# Patient Record
Sex: MALE | Race: WHITE
[De-identification: names, ages, dates, MRNs, and addresses within clinical notes are randomized per-mention and may not be internally consistent; named-entity substitution may affect disease eponyms.]

---

## 2020-04-17 ENCOUNTER — HOSPITAL ENCOUNTER (INPATIENT)
Dept: HOSPITAL 75 - ER | Age: 85
LOS: 7 days | Discharge: HOSPICE | DRG: 871 | End: 2020-04-24
Attending: INTERNAL MEDICINE | Admitting: INTERNAL MEDICINE
Payer: MEDICARE

## 2020-04-17 VITALS — DIASTOLIC BLOOD PRESSURE: 54 MMHG | SYSTOLIC BLOOD PRESSURE: 121 MMHG

## 2020-04-17 VITALS — DIASTOLIC BLOOD PRESSURE: 56 MMHG | SYSTOLIC BLOOD PRESSURE: 117 MMHG

## 2020-04-17 VITALS — SYSTOLIC BLOOD PRESSURE: 123 MMHG | DIASTOLIC BLOOD PRESSURE: 69 MMHG

## 2020-04-17 VITALS — SYSTOLIC BLOOD PRESSURE: 124 MMHG | DIASTOLIC BLOOD PRESSURE: 56 MMHG

## 2020-04-17 VITALS — DIASTOLIC BLOOD PRESSURE: 44 MMHG | SYSTOLIC BLOOD PRESSURE: 81 MMHG

## 2020-04-17 VITALS — DIASTOLIC BLOOD PRESSURE: 56 MMHG | SYSTOLIC BLOOD PRESSURE: 119 MMHG

## 2020-04-17 VITALS — DIASTOLIC BLOOD PRESSURE: 68 MMHG | SYSTOLIC BLOOD PRESSURE: 134 MMHG

## 2020-04-17 VITALS — DIASTOLIC BLOOD PRESSURE: 50 MMHG | SYSTOLIC BLOOD PRESSURE: 108 MMHG

## 2020-04-17 VITALS — DIASTOLIC BLOOD PRESSURE: 55 MMHG | SYSTOLIC BLOOD PRESSURE: 109 MMHG

## 2020-04-17 VITALS — DIASTOLIC BLOOD PRESSURE: 67 MMHG | SYSTOLIC BLOOD PRESSURE: 92 MMHG

## 2020-04-17 VITALS — SYSTOLIC BLOOD PRESSURE: 118 MMHG | DIASTOLIC BLOOD PRESSURE: 54 MMHG

## 2020-04-17 VITALS — DIASTOLIC BLOOD PRESSURE: 61 MMHG | SYSTOLIC BLOOD PRESSURE: 85 MMHG

## 2020-04-17 VITALS — SYSTOLIC BLOOD PRESSURE: 134 MMHG | DIASTOLIC BLOOD PRESSURE: 58 MMHG

## 2020-04-17 VITALS — DIASTOLIC BLOOD PRESSURE: 59 MMHG | SYSTOLIC BLOOD PRESSURE: 129 MMHG

## 2020-04-17 VITALS — DIASTOLIC BLOOD PRESSURE: 61 MMHG | SYSTOLIC BLOOD PRESSURE: 129 MMHG

## 2020-04-17 VITALS — DIASTOLIC BLOOD PRESSURE: 76 MMHG | SYSTOLIC BLOOD PRESSURE: 84 MMHG

## 2020-04-17 VITALS — SYSTOLIC BLOOD PRESSURE: 127 MMHG | DIASTOLIC BLOOD PRESSURE: 60 MMHG

## 2020-04-17 VITALS — DIASTOLIC BLOOD PRESSURE: 51 MMHG | SYSTOLIC BLOOD PRESSURE: 122 MMHG

## 2020-04-17 VITALS — DIASTOLIC BLOOD PRESSURE: 53 MMHG | SYSTOLIC BLOOD PRESSURE: 90 MMHG

## 2020-04-17 VITALS — DIASTOLIC BLOOD PRESSURE: 54 MMHG | SYSTOLIC BLOOD PRESSURE: 108 MMHG

## 2020-04-17 VITALS — SYSTOLIC BLOOD PRESSURE: 102 MMHG | DIASTOLIC BLOOD PRESSURE: 34 MMHG

## 2020-04-17 VITALS — DIASTOLIC BLOOD PRESSURE: 78 MMHG | SYSTOLIC BLOOD PRESSURE: 109 MMHG

## 2020-04-17 VITALS — SYSTOLIC BLOOD PRESSURE: 121 MMHG | DIASTOLIC BLOOD PRESSURE: 57 MMHG

## 2020-04-17 VITALS — BODY MASS INDEX: 26.16 KG/M2 | HEIGHT: 68.9 IN | WEIGHT: 176.59 LBS

## 2020-04-17 VITALS — DIASTOLIC BLOOD PRESSURE: 57 MMHG | SYSTOLIC BLOOD PRESSURE: 119 MMHG

## 2020-04-17 VITALS — SYSTOLIC BLOOD PRESSURE: 126 MMHG | DIASTOLIC BLOOD PRESSURE: 54 MMHG

## 2020-04-17 VITALS — DIASTOLIC BLOOD PRESSURE: 60 MMHG | SYSTOLIC BLOOD PRESSURE: 112 MMHG

## 2020-04-17 VITALS — SYSTOLIC BLOOD PRESSURE: 130 MMHG | DIASTOLIC BLOOD PRESSURE: 58 MMHG

## 2020-04-17 VITALS — SYSTOLIC BLOOD PRESSURE: 122 MMHG | DIASTOLIC BLOOD PRESSURE: 52 MMHG

## 2020-04-17 VITALS — SYSTOLIC BLOOD PRESSURE: 99 MMHG | DIASTOLIC BLOOD PRESSURE: 62 MMHG

## 2020-04-17 VITALS — SYSTOLIC BLOOD PRESSURE: 108 MMHG | DIASTOLIC BLOOD PRESSURE: 54 MMHG

## 2020-04-17 VITALS — DIASTOLIC BLOOD PRESSURE: 47 MMHG | SYSTOLIC BLOOD PRESSURE: 106 MMHG

## 2020-04-17 VITALS — DIASTOLIC BLOOD PRESSURE: 72 MMHG | SYSTOLIC BLOOD PRESSURE: 83 MMHG

## 2020-04-17 VITALS — DIASTOLIC BLOOD PRESSURE: 62 MMHG | SYSTOLIC BLOOD PRESSURE: 130 MMHG

## 2020-04-17 VITALS — DIASTOLIC BLOOD PRESSURE: 54 MMHG | SYSTOLIC BLOOD PRESSURE: 103 MMHG

## 2020-04-17 VITALS — SYSTOLIC BLOOD PRESSURE: 125 MMHG | DIASTOLIC BLOOD PRESSURE: 65 MMHG

## 2020-04-17 VITALS — SYSTOLIC BLOOD PRESSURE: 108 MMHG | DIASTOLIC BLOOD PRESSURE: 50 MMHG

## 2020-04-17 VITALS — DIASTOLIC BLOOD PRESSURE: 59 MMHG | SYSTOLIC BLOOD PRESSURE: 126 MMHG

## 2020-04-17 DIAGNOSIS — M48.00: ICD-10-CM

## 2020-04-17 DIAGNOSIS — M19.91: ICD-10-CM

## 2020-04-17 DIAGNOSIS — D64.9: ICD-10-CM

## 2020-04-17 DIAGNOSIS — Z99.81: ICD-10-CM

## 2020-04-17 DIAGNOSIS — Z85.828: ICD-10-CM

## 2020-04-17 DIAGNOSIS — R65.21: ICD-10-CM

## 2020-04-17 DIAGNOSIS — R54: ICD-10-CM

## 2020-04-17 DIAGNOSIS — E86.0: ICD-10-CM

## 2020-04-17 DIAGNOSIS — A41.3: Primary | ICD-10-CM

## 2020-04-17 DIAGNOSIS — E87.5: ICD-10-CM

## 2020-04-17 DIAGNOSIS — K29.71: ICD-10-CM

## 2020-04-17 DIAGNOSIS — J96.22: ICD-10-CM

## 2020-04-17 DIAGNOSIS — N17.9: ICD-10-CM

## 2020-04-17 DIAGNOSIS — K21.9: ICD-10-CM

## 2020-04-17 DIAGNOSIS — J44.0: ICD-10-CM

## 2020-04-17 DIAGNOSIS — J96.21: ICD-10-CM
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
## 2020-04-17 DIAGNOSIS — J14: ICD-10-CM

## 2020-04-17 DIAGNOSIS — I10: ICD-10-CM

## 2020-04-17 DIAGNOSIS — S12.110A: ICD-10-CM

## 2020-04-17 DIAGNOSIS — E87.0: ICD-10-CM

## 2020-04-17 DIAGNOSIS — J44.1: ICD-10-CM

## 2020-04-17 LAB
ALBUMIN SERPL-MCNC: 2.3 GM/DL (ref 3.2–4.5)
ALBUMIN SERPL-MCNC: 2.8 GM/DL (ref 3.2–4.5)
ALP SERPL-CCNC: 56 U/L (ref 40–136)
ALP SERPL-CCNC: 69 U/L (ref 40–136)
ALT SERPL-CCNC: 22 U/L (ref 0–55)
ALT SERPL-CCNC: 26 U/L (ref 0–55)
APTT BLD: 35 SEC (ref 24–35)
APTT PPP: YELLOW S
ARTERIAL PATENCY WRIST A: (no result)
BACTERIA #/AREA URNS HPF: NEGATIVE /HPF
BASE EXCESS STD BLDA CALC-SCNC: 1.4 MMOL/L (ref -2.5–2.5)
BASE EXCESS STD BLDA CALC-SCNC: 4.3 MMOL/L (ref -2.5–2.5)
BASE EXCESS STD BLDA CALC-SCNC: 7.7 MMOL/L (ref -2.5–2.5)
BASOPHILS # BLD AUTO: 0 10^3/UL (ref 0–0.1)
BASOPHILS # BLD AUTO: 0 10^3/UL (ref 0–0.1)
BASOPHILS NFR BLD AUTO: 0 % (ref 0–10)
BASOPHILS NFR BLD AUTO: 0 % (ref 0–10)
BASOPHILS NFR BLD MANUAL: 0 %
BDY SITE: (no result)
BILIRUB SERPL-MCNC: 0.4 MG/DL (ref 0.1–1)
BILIRUB SERPL-MCNC: 0.4 MG/DL (ref 0.1–1)
BILIRUB UR QL STRIP: NEGATIVE
BODY TEMPERATURE: 38
BODY TEMPERATURE: 38
BODY TEMPERATURE: 38.6
BUN/CREAT SERPL: 33
BUN/CREAT SERPL: 37
CALCIUM SERPL-MCNC: 7.9 MG/DL (ref 8.5–10.1)
CALCIUM SERPL-MCNC: 9 MG/DL (ref 8.5–10.1)
CHLORIDE SERPL-SCNC: 103 MMOL/L (ref 98–107)
CHLORIDE SERPL-SCNC: 96 MMOL/L (ref 98–107)
CO2 BLDA CALC-SCNC: 28.2 MMOL/L (ref 21–31)
CO2 BLDA CALC-SCNC: 32.8 MMOL/L (ref 21–31)
CO2 BLDA CALC-SCNC: 35.8 MMOL/L (ref 21–31)
CO2 SERPL-SCNC: 25 MMOL/L (ref 21–32)
CO2 SERPL-SCNC: 30 MMOL/L (ref 21–32)
CREAT SERPL-MCNC: 1.19 MG/DL (ref 0.6–1.3)
CREAT SERPL-MCNC: 1.56 MG/DL (ref 0.6–1.3)
D DIMER PPP FEU-MCNC: 3.82 UG/ML (ref 0–0.49)
EOSINOPHIL # BLD AUTO: 0.1 10^3/UL (ref 0–0.3)
EOSINOPHIL # BLD AUTO: 0.3 10^3/UL (ref 0–0.3)
EOSINOPHIL NFR BLD AUTO: 1 % (ref 0–10)
EOSINOPHIL NFR BLD AUTO: 2 % (ref 0–10)
EOSINOPHIL NFR BLD MANUAL: 3 %
ERYTHROCYTE [DISTWIDTH] IN BLOOD BY AUTOMATED COUNT: 14.8 % (ref 10–14.5)
ERYTHROCYTE [DISTWIDTH] IN BLOOD BY AUTOMATED COUNT: 14.9 % (ref 10–14.5)
ERYTHROCYTE [SEDIMENTATION RATE] IN BLOOD: 35 MM/HR (ref 0–30)
FIBRINOGEN PPP-MCNC: CLEAR MG/DL
GFR SERPLBLD BASED ON 1.73 SQ M-ARVRAT: 43 ML/MIN
GFR SERPLBLD BASED ON 1.73 SQ M-ARVRAT: 58 ML/MIN
GLUCOSE SERPL-MCNC: 103 MG/DL (ref 70–105)
GLUCOSE SERPL-MCNC: 120 MG/DL (ref 70–105)
GLUCOSE UR STRIP-MCNC: NEGATIVE MG/DL
HCT VFR BLD CALC: 28 % (ref 40–54)
HCT VFR BLD CALC: 30 % (ref 40–54)
HGB BLD-MCNC: 8.5 G/DL (ref 13.3–17.7)
HGB BLD-MCNC: 9.1 G/DL (ref 13.3–17.7)
HYALINE CASTS #/AREA URNS LPF: (no result) /LPF
INHALED O2 FLOW RATE: (no result) L/MIN
INR PPP: 1.4 (ref 0.8–1.4)
KETONES UR QL STRIP: NEGATIVE
LEUKOCYTE ESTERASE UR QL STRIP: NEGATIVE
LYMPHOCYTES # BLD AUTO: 1.1 X 10^3 (ref 1–4)
LYMPHOCYTES # BLD AUTO: 1.5 X 10^3 (ref 1–4)
LYMPHOCYTES NFR BLD AUTO: 11 % (ref 12–44)
LYMPHOCYTES NFR BLD AUTO: 7 % (ref 12–44)
MAGNESIUM SERPL-MCNC: 2.1 MG/DL (ref 1.6–2.4)
MAGNESIUM SERPL-MCNC: 2.1 MG/DL (ref 1.6–2.4)
MANUAL DIFFERENTIAL PERFORMED BLD QL: NO
MANUAL DIFFERENTIAL PERFORMED BLD QL: YES
MCH RBC QN AUTO: 28 PG (ref 25–34)
MCH RBC QN AUTO: 28 PG (ref 25–34)
MCHC RBC AUTO-ENTMCNC: 31 G/DL (ref 32–36)
MCHC RBC AUTO-ENTMCNC: 31 G/DL (ref 32–36)
MCV RBC AUTO: 91 FL (ref 80–99)
MCV RBC AUTO: 92 FL (ref 80–99)
METAMYELOCYTES NFR BLD: 1 %
MONOCYTES # BLD AUTO: 1.7 X 10^3 (ref 0–1)
MONOCYTES # BLD AUTO: 1.8 X 10^3 (ref 0–1)
MONOCYTES NFR BLD AUTO: 12 % (ref 0–12)
MONOCYTES NFR BLD AUTO: 12 % (ref 0–12)
MONOCYTES NFR BLD: 5 %
NEUTROPHILS # BLD AUTO: 10.7 X 10^3 (ref 1.8–7.8)
NEUTROPHILS # BLD AUTO: 12.1 X 10^3 (ref 1.8–7.8)
NEUTROPHILS NFR BLD AUTO: 75 % (ref 42–75)
NEUTROPHILS NFR BLD AUTO: 80 % (ref 42–75)
NEUTS BAND NFR BLD MANUAL: 74 %
NEUTS BAND NFR BLD: 9 %
NITRITE UR QL STRIP: NEGATIVE
PCO2 BLDA: 54 MMHG (ref 35–45)
PCO2 BLDA: 72 MMHG (ref 35–45)
PCO2 BLDA: 73 MMHG (ref 35–45)
PH BLDA: 7.26 [PH] (ref 7.37–7.43)
PH BLDA: 7.3 [PH] (ref 7.37–7.43)
PH BLDA: 7.32 [PH] (ref 7.37–7.43)
PH UR STRIP: 5 [PH] (ref 5–9)
PHOSPHATE SERPL-MCNC: 3.5 MG/DL (ref 2.3–4.7)
PHOSPHATE SERPL-MCNC: 4.5 MG/DL (ref 2.3–4.7)
PLATELET # BLD: 399 10^3/UL (ref 130–400)
PLATELET # BLD: 419 10^3/UL (ref 130–400)
PMV BLD AUTO: 9.5 FL (ref 7.4–10.4)
PMV BLD AUTO: 9.8 FL (ref 7.4–10.4)
PO2 BLDA: 117 MMHG (ref 79–93)
PO2 BLDA: 39 MMHG (ref 79–93)
PO2 BLDA: 87 MMHG (ref 79–93)
POTASSIUM SERPL-SCNC: 4.4 MMOL/L (ref 3.6–5)
POTASSIUM SERPL-SCNC: 5.4 MMOL/L (ref 3.6–5)
PROT SERPL-MCNC: 5 GM/DL (ref 6.4–8.2)
PROT SERPL-MCNC: 5.8 GM/DL (ref 6.4–8.2)
PROT UR QL STRIP: NEGATIVE
PROTHROMBIN TIME: 17.3 SEC (ref 12.2–14.7)
RBC #/AREA URNS HPF: (no result) /HPF
RBC MORPH BLD: NORMAL
SAO2 % BLDA FROM PO2: 51 % (ref 94–100)
SAO2 % BLDA FROM PO2: 95 % (ref 94–100)
SAO2 % BLDA FROM PO2: 98 % (ref 94–100)
SODIUM SERPL-SCNC: 136 MMOL/L (ref 135–145)
SODIUM SERPL-SCNC: 137 MMOL/L (ref 135–145)
SP GR UR STRIP: 1.02 (ref 1.02–1.02)
SQUAMOUS #/AREA URNS HPF: (no result) /HPF
VARIANT LYMPHS NFR BLD MANUAL: 8 %
VENTILATION MODE VENT: NO
VENTILATION MODE VENT: YES
VENTILATION MODE VENT: YES
WBC # BLD AUTO: 14.3 10^3/UL (ref 4.3–11)
WBC # BLD AUTO: 15.2 10^3/UL (ref 4.3–11)
WBC #/AREA URNS HPF: (no result) /HPF

## 2020-04-17 PROCEDURE — 96366 THER/PROPH/DIAG IV INF ADDON: CPT

## 2020-04-17 PROCEDURE — 85610 PROTHROMBIN TIME: CPT

## 2020-04-17 PROCEDURE — 80069 RENAL FUNCTION PANEL: CPT

## 2020-04-17 PROCEDURE — 85014 HEMATOCRIT: CPT

## 2020-04-17 PROCEDURE — 82962 GLUCOSE BLOOD TEST: CPT

## 2020-04-17 PROCEDURE — 96365 THER/PROPH/DIAG IV INF INIT: CPT

## 2020-04-17 PROCEDURE — 96361 HYDRATE IV INFUSION ADD-ON: CPT

## 2020-04-17 PROCEDURE — 87088 URINE BACTERIA CULTURE: CPT

## 2020-04-17 PROCEDURE — 83880 ASSAY OF NATRIURETIC PEPTIDE: CPT

## 2020-04-17 PROCEDURE — 80048 BASIC METABOLIC PNL TOTAL CA: CPT

## 2020-04-17 PROCEDURE — 71045 X-RAY EXAM CHEST 1 VIEW: CPT

## 2020-04-17 PROCEDURE — 87804 INFLUENZA ASSAY W/OPTIC: CPT

## 2020-04-17 PROCEDURE — 36415 COLL VENOUS BLD VENIPUNCTURE: CPT

## 2020-04-17 PROCEDURE — 94760 N-INVAS EAR/PLS OXIMETRY 1: CPT

## 2020-04-17 PROCEDURE — 94003 VENT MGMT INPAT SUBQ DAY: CPT

## 2020-04-17 PROCEDURE — 85379 FIBRIN DEGRADATION QUANT: CPT

## 2020-04-17 PROCEDURE — 87081 CULTURE SCREEN ONLY: CPT

## 2020-04-17 PROCEDURE — 51702 INSERT TEMP BLADDER CATH: CPT

## 2020-04-17 PROCEDURE — 94799 UNLISTED PULMONARY SVC/PX: CPT

## 2020-04-17 PROCEDURE — 96375 TX/PRO/DX INJ NEW DRUG ADDON: CPT

## 2020-04-17 PROCEDURE — 87635 SARS-COV-2 COVID-19 AMP PRB: CPT

## 2020-04-17 PROCEDURE — 85025 COMPLETE CBC W/AUTO DIFF WBC: CPT

## 2020-04-17 PROCEDURE — 87186 SC STD MICRODIL/AGAR DIL: CPT

## 2020-04-17 PROCEDURE — 86141 C-REACTIVE PROTEIN HS: CPT

## 2020-04-17 PROCEDURE — 31500 INSERT EMERGENCY AIRWAY: CPT

## 2020-04-17 PROCEDURE — 87040 BLOOD CULTURE FOR BACTERIA: CPT

## 2020-04-17 PROCEDURE — 87185 SC STD ENZYME DETCJ PER NZM: CPT

## 2020-04-17 PROCEDURE — 94002 VENT MGMT INPAT INIT DAY: CPT

## 2020-04-17 PROCEDURE — 84100 ASSAY OF PHOSPHORUS: CPT

## 2020-04-17 PROCEDURE — 87205 SMEAR GRAM STAIN: CPT

## 2020-04-17 PROCEDURE — 83735 ASSAY OF MAGNESIUM: CPT

## 2020-04-17 PROCEDURE — 36600 WITHDRAWAL OF ARTERIAL BLOOD: CPT

## 2020-04-17 PROCEDURE — 82805 BLOOD GASES W/O2 SATURATION: CPT

## 2020-04-17 PROCEDURE — 80053 COMPREHEN METABOLIC PANEL: CPT

## 2020-04-17 PROCEDURE — 85730 THROMBOPLASTIN TIME PARTIAL: CPT

## 2020-04-17 PROCEDURE — 85027 COMPLETE CBC AUTOMATED: CPT

## 2020-04-17 PROCEDURE — 85007 BL SMEAR W/DIFF WBC COUNT: CPT

## 2020-04-17 PROCEDURE — 71275 CT ANGIOGRAPHY CHEST: CPT

## 2020-04-17 PROCEDURE — 83605 ASSAY OF LACTIC ACID: CPT

## 2020-04-17 PROCEDURE — 84145 PROCALCITONIN (PCT): CPT

## 2020-04-17 PROCEDURE — 81000 URINALYSIS NONAUTO W/SCOPE: CPT

## 2020-04-17 PROCEDURE — 5A1945Z RESPIRATORY VENTILATION, 24-96 CONSECUTIVE HOURS: ICD-10-PCS | Performed by: EMERGENCY MEDICINE

## 2020-04-17 PROCEDURE — 94640 AIRWAY INHALATION TREATMENT: CPT

## 2020-04-17 PROCEDURE — 94660 CPAP INITIATION&MGMT: CPT

## 2020-04-17 PROCEDURE — 87631 RESP VIRUS 3-5 TARGETS: CPT

## 2020-04-17 PROCEDURE — 85652 RBC SED RATE AUTOMATED: CPT

## 2020-04-17 PROCEDURE — 87070 CULTURE OTHR SPECIMN AEROBIC: CPT

## 2020-04-17 PROCEDURE — 85018 HEMOGLOBIN: CPT

## 2020-04-17 PROCEDURE — 0BH17EZ INSERTION OF ENDOTRACHEAL AIRWAY INTO TRACHEA, VIA NATURAL OR ARTIFICIAL OPENING: ICD-10-PCS | Performed by: EMERGENCY MEDICINE

## 2020-04-17 PROCEDURE — 83615 LACTATE (LD) (LDH) ENZYME: CPT

## 2020-04-17 RX ADMIN — POTASSIUM CHLORIDE SCH MLS/HR: 200 INJECTION, SOLUTION INTRAVENOUS at 05:37

## 2020-04-17 RX ADMIN — Medication SCH MLS/HR: at 11:56

## 2020-04-17 RX ADMIN — PANTOPRAZOLE SODIUM SCH MG: 40 INJECTION, POWDER, FOR SOLUTION INTRAVENOUS at 20:25

## 2020-04-17 RX ADMIN — SODIUM CHLORIDE SCH MLS/HR: 900 INJECTION, SOLUTION INTRAVENOUS at 08:21

## 2020-04-17 RX ADMIN — SODIUM CHLORIDE SCH MLS/HR: 900 INJECTION, SOLUTION INTRAVENOUS at 04:30

## 2020-04-17 RX ADMIN — VASOPRESSIN SCH MLS/HR: 20 INJECTION INTRAVENOUS at 21:56

## 2020-04-17 RX ADMIN — VASOPRESSIN SCH MLS/HR: 20 INJECTION INTRAVENOUS at 11:56

## 2020-04-17 RX ADMIN — SODIUM CHLORIDE SCH MLS/HR: 900 INJECTION, SOLUTION INTRAVENOUS at 10:30

## 2020-04-17 RX ADMIN — MAGNESIUM SULFATE IN DEXTROSE SCH MLS/HR: 10 INJECTION, SOLUTION INTRAVENOUS at 05:37

## 2020-04-17 RX ADMIN — MICONAZOLE NITRATE SCH APPLIC: 20 POWDER TOPICAL at 20:26

## 2020-04-17 RX ADMIN — INSULIN ASPART SCH UNIT: 100 INJECTION, SOLUTION INTRAVENOUS; SUBCUTANEOUS at 23:31

## 2020-04-17 RX ADMIN — MICONAZOLE NITRATE SCH APPLIC: 20 POWDER TOPICAL at 11:13

## 2020-04-17 RX ADMIN — POTASSIUM CHLORIDE SCH MEQ: 1500 TABLET, EXTENDED RELEASE ORAL at 05:37

## 2020-04-17 RX ADMIN — Medication SCH MLS/HR: at 19:53

## 2020-04-17 RX ADMIN — SODIUM CHLORIDE SCH MLS/HR: 900 INJECTION, SOLUTION INTRAVENOUS at 01:10

## 2020-04-17 RX ADMIN — INSULIN ASPART SCH UNIT: 100 INJECTION, SOLUTION INTRAVENOUS; SUBCUTANEOUS at 17:59

## 2020-04-17 RX ADMIN — SODIUM CHLORIDE SCH MLS/HR: 900 INJECTION, SOLUTION INTRAVENOUS at 17:39

## 2020-04-17 RX ADMIN — VASOPRESSIN SCH MLS/HR: 20 INJECTION INTRAVENOUS at 05:37

## 2020-04-17 RX ADMIN — Medication SCH MLS/HR: at 06:17

## 2020-04-17 RX ADMIN — SODIUM CHLORIDE SCH MLS/HR: 900 INJECTION, SOLUTION INTRAVENOUS at 13:50

## 2020-04-17 RX ADMIN — SODIUM CHLORIDE SCH MLS/HR: 900 INJECTION, SOLUTION INTRAVENOUS at 22:00

## 2020-04-17 NOTE — XMS REPORT
Continuity of Care Document

                             Created on: 2020



DEAL, ONES

External Reference #: [id number]

: 1934

Sex: Male



Demographics





                          Address                   [address]

[place]  [address]

 

                          Home Phone                [phone] x

 

                          Preferred Language        Unknown

 

                          Marital Status            Unknown

 

                          Mandaen Affiliation     Unknown

 

                          Race                      Unknown

 

                          Ethnic Group              Unknown





Author





                          Organization              Unknown

 

                          Address                   Unknown

 

                          Phone                     Unavailable



              



Allergies

      



             Active           Description           Code           Type         

  Severity   

                Reaction           Onset           Reported/Identified          

 

Relationship to Patient                 Clinical Status        

 

                Yes             No Known Drug Allergies           [id number]    

       Drug 

Allergy           Unknown           N/A                             2020  

      

                                                             



                  



Medications

      



There is no data.                  



Problems

      



             Date Dx Coded           Attending           Type           Code    

       

Diagnosis                               Diagnosed By        

 

                2020           CRISTOPHER SUAREZ DO           Ot       

       J44.9       

                          CHRONIC OBSTRUCTIVE PULMONARY DISEASE, U              

      

 

                2020           CRISTOPHER SUAREZ DO           Ot       

       M16.12      

                          UNILATERAL PRIMARY OSTEOARTHRITIS, LEFT               

      

 

                2020           CRISTOPHER SUAREZ DO           Ot       

       M17.12      

                          UNILATERAL PRIMARY OSTEOARTHRITIS, LEFT               

      

 

                2020           CRISTOPHER SUAREZ DO           Ot       

       W19.XXXA    

                          UNSPECIFIED FALL, INITIAL ENCOUNTER                   

 

 

                2020           CRISTOPHER SUAREZ DO           Ot       

       J44.9       

                          CHRONIC OBSTRUCTIVE PULMONARY DISEASE, U              

      

 

             2020                        Ot           R40.2142           C

MARCELA SCALE, 

EYES OPEN, SPONTANEOUS, EMR                      

 

             2020                        Ot           R40.2252           C

MARCELA SCALE, 

BEST VERBAL RESPONSE, ORIENT                     

 

             2020                        Ot           R40.2362           C

MARCELA SCALE, 

BEST MOTOR RESPONSE, OBEYS C                     

 

             2020                        Ot           S00.81XA           A

BRASION OF 

OTHER PART OF HEAD, INITIAL                      

 

             2020                        Ot           S12.112A           N

ONDISPLACED 

TYPE II DENS FRACTURE, INIT                      

 

             2020                        Ot           S61.412A           L

ACERATION 

WITHOUT FOREIGN BODY OF LEFT                     

 

             2020                        Ot           W19.XXXA           U

NSPECIFIED 

FALL, INITIAL ENCOUNTER                          

 

             2020                        Ot           R40.2142           C

MARCELA SCALE, 

EYES OPEN, SPONTANEOUS, EMR                      

 

             2020                        Ot           R40.2252           C

MARCELA SCALE, 

BEST VERBAL RESPONSE, ORIENT                     

 

             2020                        Ot           R40.2362           C

MARCELA SCALE, 

BEST MOTOR RESPONSE, OBEYS C                     

 

             2020                        Ot           S00.81XA           A

BRASION OF 

OTHER PART OF HEAD, INITIAL                      

 

             2020                        Ot           S12.112A           N

ONDISPLACED 

TYPE II DENS FRACTURE, INIT                      

 

             2020                        Ot           S61.412A           L

ACERATION 

WITHOUT FOREIGN BODY OF LEFT                     

 

             2020                        Ot           W19.XXXA           U

NSPECIFIED 

FALL, INITIAL ENCOUNTER                          

 

                2020           GELLENDER DO, CRISTOPHER FLORES           Ot       

       J44.9       

                          CHRONIC OBSTRUCTIVE PULMONARY DISEASE, U              

      

 

                2020           GELLENDER DO, CRISTOPHER FLORES           Ot       

       M16.12      

                          UNILATERAL PRIMARY OSTEOARTHRITIS, LEFT               

      

 

                2020           GELLENDER DO, CRISTOPHER FLORES           Ot       

       M17.12      

                          UNILATERAL PRIMARY OSTEOARTHRITIS, LEFT               

      

 

                2020           GELLENDER DO, CRISTOPHER FLORES           Ot       

       W19.XXXA    

                          UNSPECIFIED FALL, INITIAL ENCOUNTER                   

 

 

                2020           GELLENDER DO, CRISTOPHER FLORES           Ot       

       J44.9       

                          CHRONIC OBSTRUCTIVE PULMONARY DISEASE, U              

      

 

                2020           GELLENDER DO, CRISTOPHER FLORES           Ot       

       J44.9       

                          CHRONIC OBSTRUCTIVE PULMONARY DISEASE, U              

      

 

                2020           GELLENDER DO, CRISTOPHER FLORES           Ot       

       J44.9       

                          CHRONIC OBSTRUCTIVE PULMONARY DISEASE, U              

      

 

                2020           GELLENDER DO, CRISTOPHER FLORES           Ot       

       M16.12      

                          UNILATERAL PRIMARY OSTEOARTHRITIS, LEFT               

      

 

                2020           GELLENDER DO, CRISTOPHER FLORES           Ot       

       M17.12      

                          UNILATERAL PRIMARY OSTEOARTHRITIS, LEFT               

      

 

                2020           GELLENDER DO, CRISTOPHER FLORES           Ot       

       W19.XXXA    

                          UNSPECIFIED FALL, INITIAL ENCOUNTER                   

 

 

                2020           GELLENDER DO, CRISTOPHER FLORES           Ot       

       J44.9       

                          CHRONIC OBSTRUCTIVE PULMONARY DISEASE, U              

      



                                                                            



Procedures

      



There is no data.                  



Results

      



There is no data.              



Encounters

      



                ACCT No.           Visit Date/Time           Discharge          

 Status         

             Pt. Type           Provider           Facility           Loc./Unit 

          

Complaint        

 

                    [id number]           2020 16:14:00           

020 23:59:59        

                CLS             Outpatient           GELLENDER DOCRISTOPHER    

       Via 

[facility]           RAD                       COPD        

 

                    [id number]           2020 12:10:00           

020 23:59:59        

                CLS             Outpatient           GELLENDER DO CRISTOPHER FLORES    

       Via 

[facility]           RAD                       FELL HIT LEFT H

IP AND KNEE 

      

 

             [id number]           2020 10:00:00                        P

MERNA CASAREZ MD, CLAUDY CORRIGAN           [facility]ia [facility]

                          CARD                      CKD STAGE III,HTN,COPD      

  

 

             [id number]           2020 15:27:00                         

            

Document Registration

## 2020-04-17 NOTE — PULMONARY CONSULTATION
History of Present Illness


History of Present Illness


Date Seen by Provider:  Apr 17, 2020


Time Seen by Provider:  06:33


Date of Admission





History of Present Illness


84yo presented from ECF secondary to worsening SOB





Allergies and Home Medications


Allergies


Coded Allergies:  


     No Known Drug Allergies (Unverified , 3/13/20)





Past Medical-Social-Family Hx


Patient Social History


Alcohol Use:  Denies Use


Recreational Drug Use:  No


Smoking Status:  Never a Smoker


Recent Foreign Travel:  No


Contact w/Someone Who Travel:  No


Recent Infectious Disease Expo:  No


Recent Hopitalizations:  No


Physical Abuse:  No


Sexual Abuse:  No


Mistreated:  No


Fear:  No





Immunizations Up To Date


Tetanus Booster (TDap):  More than 5yrs





Seasonal Allergies


Seasonal Allergies:  No





Past Medical History


Surgeries:  Yes (skin cancer removed from lip)


Respiratory:  Yes


COPD


Cardiac:  Yes


Hypertension


Neurological:  No


Genitourinary:  Yes


Renal Failure


Gastrointestinal:  Yes


Abdominal Hernia


Musculoskeletal:  Yes (osteroarthitis, spinal stenosis)


Chronic Back Pain


Endocrine:  No


HEENT:  Yes


Hearing Impairment:  Hard of Hearing


Cancer:  Yes


Skin


Did You Recieve Any Treatments:  Yes


What Type of Treatment Did You:  Surgical Intervention


Psychosocial:  No


Integumentary:  No





Sepsis Event


Evaluation


Height, Weight, BMI


Height: '"


Weight: lbs. oz. kg; 27.00 BMI


Method:





Exam


Exam





Vital Signs








  Date Time  Temp Pulse Resp B/P (MAP) Pulse Ox O2 Delivery O2 Flow Rate FiO2


 


4/17/20 06:17  86  108/54    


 


4/17/20 05:45  83 24 81/44 (56) 100 Mechanical Ventilator 70.00 


 


4/17/20 05:15  88 20 83/72 (76) 98 Mechanical Ventilator 70.00 


 


4/17/20 04:45  90 18 102/34 (56) 96 Mechanical Ventilator 70.00 


 


4/17/20 04:43  92   96  100.00 


 


4/17/20 04:30  95 19 84/76 (79) 100 Mechanical Ventilator 70.00 


 


4/17/20 04:30  93  136/67    


 


4/17/20 04:15  98 24 99/62 (74) 98 Mechanical Ventilator 70.00 


 


4/17/20 04:00  99 20 118/54 (75) 100 Mechanical Ventilator 70.00 


 


4/17/20 03:42  100      


 


4/17/20 03:13 37.9 108 24 134/68 99 Mechanical Ventilator  


 


4/17/20 03:00 37.6 108 24 134/68 (90) 99 Mechanical Ventilator  


 


4/17/20 02:45  105 24 123/69 (87) 100 Mechanical Ventilator 50.00 


 


4/17/20 02:30  100 24 119/57 (77) 99 Mechanical Ventilator 50.00 


 


4/17/20 02:20  107  119/57    


 


4/17/20 02:15  100 24 103/54 (70) 97   


 


4/17/20 02:15  105 44  96   60


 


4/17/20 02:05  98 22 92/67 (75) 98   


 


4/17/20 01:45 38.0 101 24 85/61 (69) 96   


 


4/17/20 01:30  111 22 109/78 (88) 95   


 


4/17/20 01:15  96 24 90/53 (65) 96 Nasal Cannula 3.00 


 


4/17/20 01:00  96 24 108/54 (72) 96 Nasal Cannula 3.00 


 


4/17/20 00:45 38.4 96 26 111/67 (82) 95 Nasal Cannula 3.00 


 


4/17/20 00:45     95 Nasal Cannula 3.00 














I & O 


 


 4/17/20





 07:00


 


Intake Total 3010 ml


 


Output Total 325 ml


 


Balance 2685 ml








Height & Weight


Height: '"


Weight: lbs. oz. kg; 27.00 BMI


Method:


Capillary Refill:  Less Than 3 Seconds


Gastrointestinal:  normal bowel sounds, non tender, soft, other (Ventral hernia)





Results


Lab


Laboratory Tests


4/17/20 00:50











Assessment/Plan


Assessment/Plan


Acute respiratory failure with pneumonia with Severe sepsis - question of 

aspiration 


   -Change Abx to Vanco, Zosyn and azithromycin d/c cefepime 


   -Pan cultures pending 


   -influenza is negative 


   -Check RVP 


   -COVID is pending 


Acute renal failure 


   -IVF 


   -monitor 


Hypotension


   -Currently on Levophed 


   -Give another liter bolus of LR 


Dehydration 


   -IVF 


Anemia 


   -Monitor











LENORA SLADE DO              Apr 17, 2020 06:38

## 2020-04-17 NOTE — XMS REPORT
Continuity of Care Document

                             Created on: 2020



DEAL, ONES

External Reference #: [id number]

: 1934

Sex: Male



Demographics





                          Address                   [address]

[place]  [address]

 

                          Home Phone                [phone] x

 

                          Preferred Language        Unknown

 

                          Marital Status            Unknown

 

                          Zoroastrianism Affiliation     Unknown

 

                          Race                      Unknown

 

                          Ethnic Group              Unknown





Author





                          Organization              Unknown

 

                          Address                   Unknown

 

                          Phone                     Unavailable



              



Allergies

      



             Active           Description           Code           Type         

  Severity   

                Reaction           Onset           Reported/Identified          

 

Relationship to Patient                 Clinical Status        

 

                Yes             No Known Drug Allergies           [id number]    

       Drug 

Allergy           Unknown           N/A                             2020  

      

                                                             



                  



Medications

      



There is no data.                  



Problems

      



             Date Dx Coded           Attending           Type           Code    

       

Diagnosis                               Diagnosed By        

 

                2020           CRISTOPHER SUAREZ DO           Ot       

       J44.9       

                          CHRONIC OBSTRUCTIVE PULMONARY DISEASE, U              

      

 

                2020           CRISTOPHER SUAREZ DO           Ot       

       M16.12      

                          UNILATERAL PRIMARY OSTEOARTHRITIS, LEFT               

      

 

                2020           CRISTOPHER SUAREZ DO           Ot       

       M17.12      

                          UNILATERAL PRIMARY OSTEOARTHRITIS, LEFT               

      

 

                2020           CRISTOPHER SUAREZ DO           Ot       

       W19.XXXA    

                          UNSPECIFIED FALL, INITIAL ENCOUNTER                   

 

 

                2020           CRISTOPHER SUAREZ DO           Ot       

       J44.9       

                          CHRONIC OBSTRUCTIVE PULMONARY DISEASE, U              

      

 

             2020                        Ot           R40.2142           C

MARCELA SCALE, 

EYES OPEN, SPONTANEOUS, EMR                      

 

             2020                        Ot           R40.2252           C

MARCELA SCALE, 

BEST VERBAL RESPONSE, ORIENT                     

 

             2020                        Ot           R40.2362           C

MARCELA SCALE, 

BEST MOTOR RESPONSE, OBEYS C                     

 

             2020                        Ot           S00.81XA           A

BRASION OF 

OTHER PART OF HEAD, INITIAL                      

 

             2020                        Ot           S12.112A           N

ONDISPLACED 

TYPE II DENS FRACTURE, INIT                      

 

             2020                        Ot           S61.412A           L

ACERATION 

WITHOUT FOREIGN BODY OF LEFT                     

 

             2020                        Ot           W19.XXXA           U

NSPECIFIED 

FALL, INITIAL ENCOUNTER                          

 

             2020                        Ot           R40.2142           C

MARCELA SCALE, 

EYES OPEN, SPONTANEOUS, EMR                      

 

             2020                        Ot           R40.2252           C

MARCELA SCALE, 

BEST VERBAL RESPONSE, ORIENT                     

 

             2020                        Ot           R40.2362           C

MARCELA SCALE, 

BEST MOTOR RESPONSE, OBEYS C                     

 

             2020                        Ot           S00.81XA           A

BRASION OF 

OTHER PART OF HEAD, INITIAL                      

 

             2020                        Ot           S12.112A           N

ONDISPLACED 

TYPE II DENS FRACTURE, INIT                      

 

             2020                        Ot           S61.412A           L

ACERATION 

WITHOUT FOREIGN BODY OF LEFT                     

 

             2020                        Ot           W19.XXXA           U

NSPECIFIED 

FALL, INITIAL ENCOUNTER                          

 

                2020           GELLENDER DO, CRISTOPHER FLORES           Ot       

       J44.9       

                          CHRONIC OBSTRUCTIVE PULMONARY DISEASE, U              

      

 

                2020           GELLENDER DO, CRISTOPHER FLORES           Ot       

       M16.12      

                          UNILATERAL PRIMARY OSTEOARTHRITIS, LEFT               

      

 

                2020           GELLENDER DO, CRISTOPHER FLORES           Ot       

       M17.12      

                          UNILATERAL PRIMARY OSTEOARTHRITIS, LEFT               

      

 

                2020           GELLENDER DO, CRISTOPHER FLORES           Ot       

       W19.XXXA    

                          UNSPECIFIED FALL, INITIAL ENCOUNTER                   

 

 

                2020           GELLENDER DO, CRISTOPHER FLORES           Ot       

       J44.9       

                          CHRONIC OBSTRUCTIVE PULMONARY DISEASE, U              

      

 

                2020           GELLENDER DO, CRISTOPHER FLORES           Ot       

       J44.9       

                          CHRONIC OBSTRUCTIVE PULMONARY DISEASE, U              

      

 

                2020           GELLENDER DO, CRISTOPHER FLORES           Ot       

       J44.9       

                          CHRONIC OBSTRUCTIVE PULMONARY DISEASE, U              

      

 

                2020           GELLENDER DO, CRISTOPHER FLORES           Ot       

       M16.12      

                          UNILATERAL PRIMARY OSTEOARTHRITIS, LEFT               

      

 

                2020           GELLENDER DO, CRISTOPHER FLORES           Ot       

       M17.12      

                          UNILATERAL PRIMARY OSTEOARTHRITIS, LEFT               

      

 

                2020           GELLENDER DO, CRISTOPHER FLORES           Ot       

       W19.XXXA    

                          UNSPECIFIED FALL, INITIAL ENCOUNTER                   

 

 

                2020           GELLENDER DO, CRISTOPHER FLORES           Ot       

       J44.9       

                          CHRONIC OBSTRUCTIVE PULMONARY DISEASE, U              

      



                                                                            



Procedures

      



There is no data.                  



Results

      



There is no data.              



Encounters

      



                ACCT No.           Visit Date/Time           Discharge          

 Status         

             Pt. Type           Provider           Facility           Loc./Unit 

          

Complaint        

 

                    [id number]           2020 16:14:00           

020 23:59:59        

                CLS             Outpatient           GELLENDER DOCRISTOPHER    

       Via 

[facility]           RAD                       COPD        

 

                    [id number]           2020 12:10:00           

020 23:59:59        

                CLS             Outpatient           GELLENDER DO CRISTOPHER FLORES    

       Via 

[facility]           RAD                       FELL HIT LEFT H

IP AND KNEE 

      

 

             [id number]           2020 10:00:00                        P

MERNA CASAREZ MD, CLAUDY CORRIGAN           [facility]

                          CARD                      CKD STAGE III,HTN,COPD      

  

 

             [id number]           2020 15:27:00                         

            

Document Registration

## 2020-04-17 NOTE — HISTORY & PHYSICAL-HOSPITALIST
History of Present Illness


HPI/Chief Complaint


Ones Deal is an 85-year-old male with past medical history of hypertension, COPD

with chronic hypoxia, GERD, who presented with shortness of breath.  He has been

having trouble breathing as well as cough productive of yellow sputum.  This is 

been going on for the past week.  He has been taking azithromycin for bronchit

is.  He is chronically on oxygen 2-3 L.  He is a resident of [facility].  Upon my examination, he is intubated and sedated.


Source:  RN/MD


Exam Limitations:  no limitations


Date Seen


20


Time Seen by a Provider:  08:30


Attending Physician


Brianda Hewitt MD


PCP


Arturo Archer DO


Referring Physician





Date of Admission


2020 at 01:30





Home Medications & Allergies


Home Medications


Reviewed patient Home Medication Reconciliation performed by pharmacy medication

reconciliations technician and/or nursing.


Patients Allergies have been reviewed.





Allergies





Allergies


Coded Allergies


  No Known Drug Allergies (Unverified3/13/20)








Past Medical-Social-Family Hx


Past Med/Social Hx:  Reviewed Nursing Past Med/Soc Hx


Patient Social History


Alcohol Use:  Denies Use


Recreational Drug Use:  No


Smoking Status:  Never a Smoker


Recent Foreign Travel:  No


Contact w/other who traveled:  No


Recent Hopitalizations:  No


Recent Infectious Disease Expo:  No





Immunizations Up To Date


Tetanus Booster (TDap):  More than 5yrs





Seasonal Allergies


Seasonal Allergies:  No





Past Medical History


Cardiac:  Hypertension


Genitourinary:  Renal Failure


Gastrointestinal:  Abdominal Hernia


Musculoskeletal:  Chronic Back Pain


Hearing Impairment:  Hard of Hearing


Cancer:  Skin


Did You Recieve Any Treatments:  Yes


What Type of Treatment Did You:  Surgical Intervention





Review of Systems


Constitutional:  see HPI





Physical Exam


Physical Exam


Vital Signs





Vital Signs - First Documented








 20





 00:45 02:15


 


Temp 38.4 


 


Pulse 96 


 


Resp 26 


 


B/P (MAP) 111/67 (82) 


 


Pulse Ox 95 


 


O2 Delivery Nasal Cannula 


 


O2 Flow Rate 3.00 


 


FiO2  60





Capillary Refill : Less Than 3 Seconds


Height, Weight, BMI


Height: '"


Weight: lbs. oz. kg; 27.00 BMI


Method:


General Appearance:  No Apparent Distress, WD/WN, Other (Intubated and sedated)


HEENT:  Other (Endotracheal tube in place, OG tube in place)


Neck:  Normal Inspection, Supple


Respiratory:  No Respiratory Distress, Crackles, Rhonci


Cardiovascular:  Regular Rate, Rhythm, No Edema


Gastrointestinal:  Normal Bowel Sounds, Soft


Extremity:  Normal Inspection, Non Tender, No Pedal Edema


Neurologic/Psychiatric:  Other (sedated)


Skin:  Normal Color, Warm/Dry





Results


Results/Procedures


Labs


Laboratory Tests


20 00:50








20 08:15








Patient resulted labs reviewed.


Imaging:  Reviewed Imaging Report





Assessment/Plan


Admission Diagnosis


Septic shock


Admission Status:  Inpatient Order (span 2 midnights)


Reason for Inpatient Admission:  


Septic shock due to pneumonia requiring further evaluation and treatment





Assessment and Plan


Septic shock


Pneumonia


Acute on chronic respiratory failure with hypoxemia and hypercapnia


COPD without acute exacerbation


Acute kidney injury


Hyperkalemia


Advanced age


Poor prognosis


Imaging consistent with multifocal pneumonia


ABG consistent with acute hypercapnia and hypoxia


Creatinine elevated at 1.56 on arrival, improved to 1.1 this morning


Started on Levophed for shock


Pulmonology consulted, appreciate assistance


COVID pending


Flu negative


Sputum culture pending


BNP mildly elevated


Continue IV fluids


Continue vancomycin, Zosyn, and azithromycin





DVT Prophylaxis: Lovenox





Diagnosis/Problems


Diagnosis/Problems





(1) Septic shock


Status:  Acute


(2) Pneumonia


Status:  Acute


(3) Acute on chronic respiratory failure with hypoxia and hypercapnia


Status:  Acute


(4) Acute kidney injury


Status:  Acute


(5) COPD with acute lower respiratory infection


Status:  Acute


(6) Advanced age


Status:  Chronic


(7) Poor prognosis


Status:  Acute





Clinical Quality Measures


DVT/VTE Risk/Contraindication:


Risk Factor Score Per Nursin


RFS Level Per Nursing on Admit:  4+=Very High











BRIANDA HEWITT MD              2020 11:31

## 2020-04-17 NOTE — NUR
20MG ETOMIDATE GIVEN

0216-50MG SUCCINYLCHOLINE GIVEN.

0219- PT INTUBATED BY ERP. 8.0 OETT 23CM AT GUMS X 1 ATTEMPT. + BREATH SOUNDS 
BILATERALLY, COLOR CHANGE ON CO2 DETECTOR.

0224- OG TUBE PLACED.

0248- RIJTL PLACED

## 2020-04-17 NOTE — OCC THERAPY PROGRESS NOTE
Therapy Progress Note


Pt intubated/ sedated on this date. OT to hold tx and continue to follow; will 

begin eval/ treat when medically stable and able to participate in skilled 

therapy.











PETER MADSEN OTR                Apr 17, 2020 08:13

## 2020-04-17 NOTE — NUR
PTD VANCOMYCIN

LABS: SCR 1.56 (CRCL ~ 36)

A/P: VANCOMYCIN 1,750MG IV GIVE THIS AM AT 0200, WILL DOSE 15MG/KG ~ 1,250MG IV Q24H, WITH 
NEXT DOSE DUE AT 0600.  TIMED TO BE GIVEN WITH OTHER 0600 MEDICATIONS STARTING TOMORROW.  
WILL CHECK A TROUGH LEVEL ON 4/19/2020 @ 0600 HOLDING IF LEVEL IS GREATER THAN 20.

## 2020-04-17 NOTE — DIAGNOSTIC IMAGING REPORT
PROCEDURE: CT angiography of the chest with contrast.



TECHNIQUE: Multiple contiguous axial images were obtained through

the chest after uneventful bolus administration of intravenous

contrast. 3D reconstructed CTA MIP acquisitions were also

performed.

Auto Exposure Controls were utilized during the CT exam to meet

ALARA standards for radiation dose reduction. 

 

INDICATION:  Respiratory distress.



FINDINGS:  There are no intraluminal pulmonary arterial filling

defects.  There are no findings of pulmonary arterial embolus. 

The thoracic aorta is patent and nonaneurysmal.  There is a

minute right and small left pleural effusions that showed no

loculation on the left layering to a 2 cm depth maximal.  There

is some small subcentimeter hilar mediastinal lymph nodes likely

reactive.  An ET tube tip is above the delroy. Innumerable

centrilobular nodules and airspace opacities in the bilateral

lungs involve all 5 lobes but favor the lower and dependent

distributions, suggestive of infectious bronchiolitis versus a

less likely extensive recurrent episodes of aspiration. No

pneumothorax. OG catheter extends into the stomach beyond.  There

is no pneumothorax.  No acute soft tissue or osseous chest wall

pathology.  The visualized upper abdomen nonacute.



IMPRESSION:  Negative for PE or acute aortic disease, severe

5-lobed central lobular nodular infiltrates favoring infectious

bronchiolitis, recurrent aspiration could not be excluded in the

appropriate scenario.  Small amounts of nonloculated pleural

fluid without evidence for abscess or empyema, nonacute aorta, no

pneumothorax.  ET tube above the delroy in good position.



Dictated by: 



  Dictated on workstation # [id number]

## 2020-04-17 NOTE — NUR
PT TO E.D. BY EMS C/O INCREASED SOA TONIGHT. STARTED ON AZITHROMYCIN APPROX. 3 
DAYS AGO WITHOUT IMPROVEMENT. PT WITH NOTED INCREASED WORK OF BREATHING. ERP. 
AT BEDSIDE.

## 2020-04-17 NOTE — ED RESPIRATORY
General


Stated Complaint:  RESPIRATORY DISTRESS & FEVER


Source:  patient, EMS, nursing home records


Exam Limitations:  clinical condition





History of Present Illness


Date Seen by Provider:  Apr 17, 2020


Time Seen by Provider:  00:53


Initial Comments


Patient presents to ER via EMS from [facility] with chief

complaint of about a week of shortness of breath, increased worker breathing. He

was seen by primary care recently and started on azithromycin. He is not on ster

oids. He has a history of COPD oxygen dependent on 2-3 L per nasal cannula. 

Oxygen sats were in the 90s per EMS. Unknown if he's had a chest x-ray or labs. 

One month ago he had a fall resulting in a dens fracture and was referred to 

neurosurgery but was not a candidate for surgical repair apparently. He refuses 

to wear the collar or soft collar. Nursing staff tonight reports that while 

doing there checks he had increased work of breathing as well as a fever tonight

of 100.3 which was negative. Unknown if he had any specific testing at the time 

he was diagnosed with bronchitis. Nursing staff also reports he had a cough with

yellow productive sputum. He said every few months she develops similar 

symptoms. Typically treated outpatient.





Allergies and Home Medications


Allergies


Coded Allergies:  


     No Known Drug Allergies (Unverified , 3/13/20)





Patient Home Medication List


Home Medication List Reviewed:  Yes





Review of Systems


Review of Systems


Constitutional:  chills, fever, malaise


EENTM:  No ear discharge, No ear pain


Respiratory:  cough, phlegm, short of breath, wheezing


Cardiovascular:  No chest pain, No edema, No palpitations


Gastrointestinal:  No abdominal pain, No nausea, No vomiting


Genitourinary:  No discharge, No dysuria


Musculoskeletal:  No back pain, No joint pain


Skin:  No pruritus, No rash


Psychiatric/Neurological:  Denies Headache, Denies Numbness





All Other Systems Reviewed


Negative Unless Noted:  Yes





Past Medical-Social-Family Hx


Patient Social History


Alcohol Use:  Denies Use


Recreational Drug Use:  No


Smoking Status:  Never a Smoker


Recent Hopitalizations:  No





Immunizations Up To Date


Tetanus Booster (TDap):  More than 5yrs





Seasonal Allergies


Seasonal Allergies:  No





Past Medical History


Surgeries:  Yes (skin cancer removed from lip)


Respiratory:  Yes


COPD


Cardiac:  Yes


Hypertension


Neurological:  No


Genitourinary:  Yes


Renal Failure


Gastrointestinal:  Yes


Abdominal Hernia


Musculoskeletal:  Yes (osteroarthitis, spinal stenosis)


Chronic Back Pain


Endocrine:  No


HEENT:  Yes


Hearing Impairment:  Hard of Hearing


Cancer:  Yes


Skin


Did You Recieve Any Treatments:  Yes


What Type of Treatment Did You:  Surgical Intervention


Psychosocial:  No





Physical Exam





Vital Signs - First Documented








 4/17/20





 00:45


 


Temp 38.4


 


Pulse 96


 


Resp 26


 


B/P (MAP) 111/67 (82)


 


Pulse Ox 95


 


O2 Delivery Nasal Cannula


 


O2 Flow Rate 3.00





Capillary Refill :


Height: '"


Weight: lbs. oz. kg; 27.00 BMI


Method:


General Appearance:  moderate distress, other (disheveled)


Eyes:  Bilateral Eye PERRL, Bilateral Eye EOMI, Bilateral Eye Other (Bilateral 

mattering mild)


HEENT:  PERRL/EOMI, normal ENT inspection, TMs normal, other (Dry oral mucosa)


Neck:  non-tender, full range of motion, normal inspection


Respiratory:  chest non-tender, respiratory distress (Moderate with increased 

worker breathing), decreased breath sounds, accessory muscle use 

(Mild-to-moderate)


Cardiovascular:  regular rate, rhythm, no edema, no murmur, tachycardia


Gastrointestinal:  normal bowel sounds, non tender, soft, other (Ventral hernia)


Extremities:  normal range of motion, normal inspection, no pedal edema, normal 

capillary refill


Neurologic/Psychiatric:  no motor/sensory deficits, alert, normal mood/affect, 

other (Oriented to person)


Skin:  normal color, warm/dry





Focused Exam


Lactate Level


4/17/20 00:50: Lactic Acid Level 0.79





Lactic Acid Level





Laboratory Tests








Test


 4/17/20


00:50


 


Lactic Acid Level


 0.79 MMOL/L


(0.50-2.00)











Procedures/Interventions


Lumen:  triple


Central Line Procedure:  betadine prep (chlorhexidine), sterile drapes applied, 

sterile dressing applied


Position:  internal jugular (R)


Anesthesia:  Lidocaine (1% without lidocaine 3 cc)


Volume Anesthetic (ccs):  3


Complications:  none


Post Position:  sutured, good blood return, position confirmed w/ CXR


Site was cleaned thoroughly using chlorhexidine and draped in usual sterile 

fashion. Everybody is wearing appropriate sterile gown, gloves, headgear, face 

mask. Using ultrasound guidance we were able to easily introducer needle into 

his right IJ which was plump. We got good blood return and passed a guidewire 

easily on first attempt. Query made a small nick in the skin using the supplied 

11 blades. We removed the needle and placed the dilator over the guidewire and 

then removed dilator. We then ran the central lumen of the triple lumen catheter

over the guidewire had previously been flushed with sterile saline. We removed 

the guidewire stitched in place at about 14 cm and applied the sterile dressing 

from the kit. Patient tolerated procedure well. Minimal blood loss.


Reason for Intubation:  REsp fail CO2 and O2


Date of ETT Placement:  Apr 17, 2020


Time of ETT Placement:  02:16


Intubation Method:  orotracheal


Tube Size:  8.0


Medications:  Etomidate (20 mg), Rocuronium (50 mg)


Positive End Tide CO2:  Yes


Breath Sounds after Intubation:  bilateral-equal


Intubation Complications:  no complications


Post Intubation Xray:  Yes


appears low. Subsequent x-ray in good position


We explained the reasoning for intubation after attempting to treat him with 

BiPAP unsuccessfully. Patient acknowledged by nodding his head yes. He had a cristal

nce to speak briefly his wife over the phone. We then push etomidate followed by

rocuronium. We have suctioned his oropharynx and then using a cane vision and an

8.0 ET tube placed the ET tube across the vocal cords on first attempt. We 

inflated the bulb and using a bag valve mask his exhalation and the tube as well

as change the colorimetric paper. Patient's oxygen sats were 97% when we 

initiated intubation and 98% after intubation. Bilateral, symmetric breath 

sounds were heard over both lungs and no breath sounds were heard over the 

epigastric region. Patient tolerated procedure well. Propofol was initiated. ABG

was ordered for 30 minutes later.


Initial chest x-ray demonstrated the radiopaque line of the ET tube was very 

close the delroy so we redrew it from 23 at the gumline to 21 cm. Repeat chest 

x-ray then demonstrated 2 to 3 cm above the delroy which was good position.





Progress/Results/Core Measures


Suspected Sepsis


SIRS


Temperature: 


Pulse:  


Respiratory Rate: 


 


Laboratory Tests


4/17/20 00:50: White Blood Count 14.3H


Blood Pressure  / 


Mean: 


 





4/17/20 00:50: Lactic Acid Level 0.79


Laboratory Tests


4/17/20 00:50: 


Creatinine 1.56H, INR Comment 1.4, Platelet Count 419H, Total Bilirubin 0.4








Results/Orders


Lab Results





Laboratory Tests








Test


 4/17/20


00:50 4/17/20


01:03 4/17/20


01:45 4/17/20


02:48 Range/Units


 


 


White Blood Count


 14.3 H


 


 


 


 4.3-11.0


10^3/uL


 


Red Blood Count


 3.24 L


 


 


 


 4.35-5.85


10^6/uL


 


Hemoglobin 9.1 L    13.3-17.7  G/DL


 


Hematocrit 30 L    40-54  %


 


Mean Corpuscular Volume 91     80-99  FL


 


Mean Corpuscular Hemoglobin 28     25-34  PG


 


Mean Corpuscular Hemoglobin


Concent 31 L


 


 


 


 32-36  G/DL





 


Red Cell Distribution Width 14.9 H    10.0-14.5  %


 


Platelet Count


 419 H


 


 


 


 130-400


10^3/uL


 


Mean Platelet Volume 9.8     7.4-10.4  FL


 


Neutrophils (%) (Auto) 75     42-75  %


 


Lymphocytes (%) (Auto) 11 L    12-44  %


 


Monocytes (%) (Auto) 12     0-12  %


 


Eosinophils (%) (Auto) 2     0-10  %


 


Basophils (%) (Auto) 0     0-10  %


 


Neutrophils # (Auto) 10.7 H    1.8-7.8  X 10^3


 


Lymphocytes # (Auto) 1.5     1.0-4.0  X 10^3


 


Monocytes # (Auto) 1.7 H    0.0-1.0  X 10^3


 


Eosinophils # (Auto)


 0.3 


 


 


 


 0.0-0.3


10^3/uL


 


Basophils # (Auto)


 0.0 


 


 


 


 0.0-0.1


10^3/uL


 


Erythrocyte Sedimentation Rate 35 H    0-30  MM/HR


 


Prothrombin Time 17.3 H    12.2-14.7  SEC


 


INR Comment 1.4     0.8-1.4  


 


Activated Partial


Thromboplast Time 35 


 


 


 


 24-35  SEC





 


D-Dimer


 3.82 H


 


 


 


 0.00-0.49


UG/ML


 


Blood Gas Puncture Site LEFT RADIAL   RIGHT RADIAL    


 


Blood Gas Patient Temperature 38.6   38    


 


Arterial Blood pH 7.30 *L  7.26 *L  7.37-7.43  


 


Arterial Blood Partial


Pressure CO2 73 *H


 


 72 *H


 


 35-45  MMHG





 


Arterial Blood Partial


Pressure O2 117 H


 


 39 *L


 


 79-93  MMHG





 


Arterial Blood HCO3 34 H  31 H  23-27  MMOL/L


 


Arterial Blood Total CO2


 35.8 H


 


 32.8 H


 


 21.0-31.0


MMOL/L


 


Arterial Blood Oxygen


Saturation 98 


 


 51 L


 


   %





 


Arterial Blood Base Excess


 7.7 H


 


 4.3 H


 


 -2.5-2.5


MMOL/L


 


Hector Test YES-POS   P    


 


Blood Gas Ventilator Setting NO   YES    


 


Blood Gas Inspired Oxygen 3L   3L    


 


Sodium Level 136     135-145  MMOL/L


 


Potassium Level 5.4 H    3.6-5.0  MMOL/L


 


Chloride Level 96 L      MMOL/L


 


Carbon Dioxide Level 30     21-32  MMOL/L


 


Anion Gap 10     5-14  MMOL/L


 


Blood Urea Nitrogen 51 H    7-18  MG/DL


 


Creatinine


 1.56 H


 


 


 


 0.60-1.30


MG/DL


 


Estimat Glomerular Filtration


Rate 43 


 


 


 


  





 


BUN/Creatinine Ratio 33      


 


Glucose Level 103       MG/DL


 


Lactic Acid Level


 0.79 


 


 


 


 0.50-2.00


MMOL/L


 


Calcium Level 9.0     8.5-10.1  MG/DL


 


Corrected Calcium 10.0     8.5-10.1  MG/DL


 


Total Bilirubin 0.4     0.1-1.0  MG/DL


 


Aspartate Amino Transf


(AST/SGOT) 38 H


 


 


 


 5-34  U/L





 


Alanine Aminotransferase


(ALT/SGPT) 26 


 


 


 


 0-55  U/L





 


Alkaline Phosphatase 69       U/L


 


Lactate Dehydrogenase 211     125-220  U/L


 


C-Reactive Protein High


Sensitivity 23.12 H


 


 


 


 0.00-0.50


MG/DL


 


B-Type Natriuretic Peptide 167.3 H    <100.0  PG/ML


 


Total Protein 5.8 L    6.4-8.2  GM/DL


 


Albumin 2.8 L    3.2-4.5  GM/DL


 


Procalcitonin 0.32 H    <0.10  NG/ML


 


Urine Color    YELLOW   


 


Urine Clarity    CLEAR   


 


Urine pH    5.0  5-9  


 


Urine Specific Gravity    1.020  1.016-1.022  


 


Urine Protein    NEGATIVE  NEGATIVE  


 


Urine Glucose (UA)    NEGATIVE  NEGATIVE  


 


Urine Ketones    NEGATIVE  NEGATIVE  


 


Urine Nitrite    NEGATIVE  NEGATIVE  


 


Urine Bilirubin    NEGATIVE  NEGATIVE  


 


Urine Urobilinogen    1.0  < = 1.0  MG/DL


 


Urine Leukocyte Esterase    NEGATIVE  NEGATIVE  


 


Urine RBC (Auto)    NEGATIVE  NEGATIVE  


 


Urine RBC    NONE   /HPF


 


Urine WBC    NONE   /HPF


 


Urine Squamous Epithelial


Cells 


 


 


 NONE 


  /HPF





 


Urine Crystals    NONE   /LPF


 


Urine Bacteria    NEGATIVE   /HPF


 


Urine Casts    PRESENT   /LPF


 


Urine Hyaline Casts    2-5 H  /LPF


 


Urine Mucus    LARGE H  /LPF


 


Urine Culture Indicated


 


 


 


 CULTURE


PENDING  





 


Test


 4/17/20


02:52 


 


 


 Range/Units


 


 


Blood Gas Puncture Site LEFT RADIAL      


 


Blood Gas Patient Temperature 38      


 


Arterial Blood pH 7.32 *L    7.37-7.43  


 


Arterial Blood Partial


Pressure CO2 54 H


 


 


 


 35-45  MMHG





 


Arterial Blood Partial


Pressure O2 87 


 


 


 


 79-93  MMHG





 


Arterial Blood HCO3 27     23-27  MMOL/L


 


Arterial Blood Total CO2


 28.2 


 


 


 


 21.0-31.0


MMOL/L


 


Arterial Blood Oxygen


Saturation 95 


 


 


 


   %





 


Arterial Blood Base Excess


 1.4 


 


 


 


 -2.5-2.5


MMOL/L


 


Hector Test P      


 


Blood Gas Ventilator Setting YES      


 


Blood Gas Inspired Oxygen 50%      








Micro Results





Microbiology


4/17/20 Influenza Types A,B Antigen (FABI) - Final, Complete


          





My Orders





Orders - JOHN COLUNGA


Cbc With Automated Diff (4/17/20 00:54)


Comprehensive Metabolic Panel (4/17/20 00:54)


Blood Culture (4/17/20 00:54)


Sputum Culture (4/17/20 00:54)


Urinalysis (4/17/20 00:54)


Urine Culture (4/17/20 00:54)


Protime With Inr (4/17/20 00:54)


Partial Thromboplastin Time (4/17/20 00:54)


Chest 1 View, Ap/Pa Only (4/17/20 00:54)


Ed Iv/Invasive Line Start (4/17/20 00:54)


Ed Iv/Invasive Line Start (4/17/20 00:54)


Vital Signs Adult Sepsis Patie Q15M (4/17/20 00:54)


O2 (4/17/20 00:54)


Remove Rings In Anticipation O (4/17/20 00:54)


Lactic Acid Analyzer (4/17/20 00:54)


Influenza A And B Antigens (4/17/20 00:54)


Ns Iv 1000 Ml (Sodium Chloride 0.9%) (4/17/20 00:54)


Cefepime Injection (Maxipime Injection) (4/17/20 01:00)


Vancomycin Injection (Vancomycin Injecti (4/17/20 01:00)


Vancomycin Injection (Vancomycin Injecti (4/17/20 02:00)


Ed Iv/Invasive Line Start (4/17/20 00:54)


Ns Iv 500 Ml (Sodium Chloride 0.9%) (4/17/20 00:54)


Ns Iv 1000 Ml (Sodium Chloride 0.9%) (4/17/20 00:54)


Albuterol/Ipra Inhalation Soln (Duoneb I (4/17/20 01:00)


Svn Small Volume Nebulizer (4/17/20 00:54)


Arterial Blood Gas (4/17/20 00:54)


Bipap (Bilevel) Set Up (4/17/20 00:54)


Fibrin Degradation Products (4/17/20 00:54)


Procalcitonin (Pct) (4/17/20 00:54)


Hs C Reactive Protein (4/17/20 00:54)


Erythrocyte Sedimentation Rate (4/17/20 00:54)


LDH (4/17/20 00:54)


Coronavirus Sars-Cov-2 So 2019 (4/17/20 00:54)


Covid-19 Suspect Update (4/17/20 00:54)


Ct Angio Chest W (4/17/20 01:29)


BNP (4/17/20 01:34)


Arterial Blood Gas (4/17/20 01:53)


Propofol Drip (Icu) (Diprivan Drip (Icu) (4/17/20 01:59)


Arterial Blood Gas (4/17/20 02:51)


Propofol Drip (Icu) (Diprivan Drip (Icu) (4/17/20 03:27)





Medications Given in ED





Current Medications








 Medications  Dose


 Ordered  Sig/Caron


 Route  Start Time


 Stop Time Status Last Admin


Dose Admin


 


 Cefepime HCl 1000


 mg/Sterile Water  10 ml @ 


 200 mls/hr  ONCE  ONCE


 IV  4/17/20 01:00


 4/17/20 01:03 DC 4/17/20 01:10


200 MLS/HR


 


 Propofol  100 ml @ ud  STK-MED ONCE


 IV  4/17/20 01:59


 4/17/20 02:07 DC 4/17/20 02:20


8.2 MLS/HR


 


 Sodium Chloride  500 ml @ 0


 mls/hr  Q0M ONCE


 IV  4/17/20 00:54


 4/17/20 01:03 DC 4/17/20 02:15


0 MLS/HR


 


 Vancomycin HCl


 750 mg/Sodium


 Chloride  250 ml @ 


 250 mls/hr  ONCE  ONCE


 IV  4/17/20 02:00


 4/17/20 02:59 DC 4/17/20 02:15


250 MLS/HR


 


 Vancomycin HCl


 1000 mg/Sodium


 Chloride  250 ml @ 


 250 mls/hr  ONCE  ONCE


 IV  4/17/20 01:00


 4/17/20 01:59 DC 4/17/20 01:15


250 MLS/HR








Vital Signs/I&O











 4/17/20 4/17/20 4/17/20 4/17/20





 00:45 00:45 01:00 01:15


 


Temp  38.4  


 


Pulse  96 96 96


 


Resp  26 24 24


 


B/P (MAP)  111/67 (82) 108/54 (72) 90/53 (65)


 


Pulse Ox 95 95 96 96


 


O2 Delivery Nasal Cannula Nasal Cannula Nasal Cannula Nasal Cannula


 


O2 Flow Rate 3.00 3.00 3.00 3.00


 


    





 4/17/20 4/17/20 4/17/20 4/17/20





 01:30 01:45 02:05 02:15


 


Temp  38.0  


 


Pulse 111 101 98 100


 


Resp 22 24 22 24


 


B/P (MAP) 109/78 (88) 85/61 (69) 92/67 (75) 103/54 (70)


 


Pulse Ox 95 96 98 97


 


    





 4/17/20 4/17/20 4/17/20 4/17/20





 02:20 02:30 02:45 03:00


 


Temp    37.6


 


Pulse 107 100 105 108


 


Resp  24 24 24


 


B/P (MAP) 119/57 119/57 (77) 123/69 (87) 134/68 (90)


 


Pulse Ox  99 100 99


 


O2 Delivery  Mechanical Ventilator Mechanical Ventilator Mechanical Ventilator


 


O2 Flow Rate  50.00 50.00 


 


    





 4/17/20   





 03:13   


 


Temp 37.9   


 


Pulse 108   


 


Resp 24   


 


B/P (MAP) 134/68   


 


Pulse Ox 99   


 


O2 Delivery Mechanical Ventilator   





Capillary Refill :


Progress Note :  


   Time:  01:17


Progress Note


Septic workup, COVID-19 influenza testing. 2500 cc for 30 mL/kg.





Put him on BiPAP 5/15. ABG obtained shows CO2 of 72 and pH of 7.3. Plan to 

recheck in about 30 minutes and if it's not improving. We may consider 

intubation.





Diagnostic Imaging





   Diagonstic Imaging:  Xray


   Plain Films/CT/US/NM/MRI:  chest


Comments


Patchy infiltrates bilateral. More concentrated infiltrate right lower lobe.


   Reviewed:  Reviewed by Me








   Diagonstic Imaging:  Xray


   Plain Films/CT/US/NM/MRI:  chest


Comments


Interval placement of central catheter with the distal tip terminating and over 

the shadow of the superior vena cava just proximal to the right atria in good p

osition.


Difficult to see the ET tube but it does appear to be close to the delroy and 

needs withdrawn about 2-3 cm.


   Reviewed:  Reviewed by Me








   Diagonstic Imaging:  Xray


   Plain Films/CT/US/NM/MRI:  chest


Comments


Repeat chest x-ray shows ET tube in good position 237 m above the delroy.


   Reviewed:  Reviewed by Me








   Diagonstic Imaging:  CT (angiogram)


   Plain Films/CT/US/NM/MRI:  chest


   Reviewed:  Reviewed by Me





Critical Care Note


Critical Care


Start Time:  01:50


Stop Time:  03:00


Total Time (minutes)


70m


Progress


Patient acute respiratory distress. Initial ABG shows respiratory acidosis. We 

attempted valiantly to use BiPAP with high pressure and were unsuccessful in 

improving his CO2 and the patient continued wear himself outside for about 30 

minute trial we initiated and intubation. Patient tolerated intubation well his 

oxygen demand improved and he produced a better ABG within just 30 minutes. 

Patient was then transferred to CT angiogram and from there to the ICU.





Departure


Communication (Admissions)


Time/Spoke to Admitting Phy:  03:51


Discussed the case with Dr. Barragan and she agrees with plan.





Impression





   Primary Impression:  


   Acute respiratory failure with hypoxia and hypercapnia


   Additional Impressions:  


   Atypical pneumonia


   COPD with exacerbation


Disposition:  09 ADMITTED AS INPATIENT


Condition:  Critical





Admissions


Decision to Admit Reason:  Admit from ER (General)


Decision to Admit/Date:  Apr 17, 2020


Time/Decision to Admit Time:  01:19





Departure-Patient Inst.


Referrals:  


CRISTOPHER SUAREZ DO (PCP/Family)


Primary Care Physician











JOHN COLUNGA                 Apr 17, 2020 01:14

## 2020-04-17 NOTE — PHYSICAL THERAPY PROGRESS NOTE
Therapy Progress Note


Pt intubated/sedated. Hold PT eval and continue to follow until Pt medically 

stable, able to actively participate with PT.











LACHO JACKSON DPT              Apr 17, 2020 08:38

## 2020-04-17 NOTE — DIAGNOSTIC IMAGING REPORT
Indication: Respiratory distress.



Compared:  04/17/2020.



Findings:  Bilateral micronodular pulmonary infiltrates showed no

real change.  There are small amounts of pleural fluid not

obviously changed ET tube is above the delroy, some basilar

atelectasis.  Right IJ at the SVC, OG catheter goes beneath the

diaphragm.



Impression:  No real change in bilateral infiltrates, pleural

fluid and support apparatus.



Dictated by: 



  Dictated on workstation # [id number]

## 2020-04-17 NOTE — DIAGNOSTIC IMAGING REPORT
INDICATION:  Respiratory distress.



TECHNIQUE:  Single view chest 1:18 AM.



CORRELATION STUDY:  03/03/2020



FINDINGS: 

Heart size is enlarged. Vasculature slightly increased.  

There is presence of scattered patchy pulmonary parenchymal

densities predominantly involving all 5 lobes most pronounced at

lung bases right greater than left.



IMPRESSION: 

1. Bilateral infiltrates most pronounced lung bases most

compatible with pneumonia. Given distribution, aspiration is

consideration. Followup imaging recommended.



Dictated by: 



  Dictated on workstation # [id number]

## 2020-04-17 NOTE — NUR
RD ASSESSMENT



PMH: HTN; COPD; CA(skin)



Note pt is currently intubated/sedated. All information is taken per chart review. Note 
recent 13# wt loss x1mon. 



Abnormal Lab Values: BUN 44 (H); glu 120 (H); Ca 7.9 (L); Pro 5.0 (L); alb 2.3 (L)



If pt is to remain NPO for more than 3 days, would recommend the following TF: 



Jevity 1.5 at goal rate of 55ml/hr. Begin at 10ml/hr and increase by 10ml q6h as medically 
able and as tolerated. Monitor gastric residuals for tolerance. 

At goal rate, provides 1980 kcal (25 kcal/kg); 84 g Pro (1.1 g Pro/kg); and 1003ml free 
water. 

Flush with 75ml H2O q4h for hydration status. With flushes, provides 1453ml free water. 



Will continue to follow and reassess as pt needs, intake, and status change. 



ROLAND Nunn MS, RD, LD

[phone]

## 2020-04-17 NOTE — DIAGNOSTIC IMAGING REPORT
INDICATION: Respiratory distress.



Exam compared with study earlier the same date. This film is

timed 259 hours.



FINDINGS: Bilateral effusions, 5 lobe infiltrates and support

apparatus are in good alignment. The ET tube projects over the

midthoracic trachea. OG goes into the stomach and a right IJ at

the SVC. There is no pneumothorax.



IMPRESSION: Unchanged bilateral infiltrates and pleural fluid,

intervally placed lines and catheters project in good alignment.



Dictated by: 



  Dictated on workstation # [id number]

## 2020-04-17 NOTE — NUR
RT IN COVID ED FROM 0045 TO 0315 WHEN PT WAS MOVED TO CT WITHOUT INCIDENT, PT THEN TAKEN TO 
CU 5, PLACED ON VENT

## 2020-04-18 VITALS — DIASTOLIC BLOOD PRESSURE: 56 MMHG | SYSTOLIC BLOOD PRESSURE: 112 MMHG

## 2020-04-18 VITALS — DIASTOLIC BLOOD PRESSURE: 57 MMHG | SYSTOLIC BLOOD PRESSURE: 113 MMHG

## 2020-04-18 VITALS — SYSTOLIC BLOOD PRESSURE: 143 MMHG | DIASTOLIC BLOOD PRESSURE: 74 MMHG

## 2020-04-18 VITALS — DIASTOLIC BLOOD PRESSURE: 69 MMHG | SYSTOLIC BLOOD PRESSURE: 131 MMHG

## 2020-04-18 VITALS — SYSTOLIC BLOOD PRESSURE: 136 MMHG | DIASTOLIC BLOOD PRESSURE: 73 MMHG

## 2020-04-18 VITALS — SYSTOLIC BLOOD PRESSURE: 136 MMHG | DIASTOLIC BLOOD PRESSURE: 76 MMHG

## 2020-04-18 VITALS — DIASTOLIC BLOOD PRESSURE: 63 MMHG | SYSTOLIC BLOOD PRESSURE: 133 MMHG

## 2020-04-18 VITALS — SYSTOLIC BLOOD PRESSURE: 115 MMHG | DIASTOLIC BLOOD PRESSURE: 59 MMHG

## 2020-04-18 VITALS — DIASTOLIC BLOOD PRESSURE: 70 MMHG | SYSTOLIC BLOOD PRESSURE: 137 MMHG

## 2020-04-18 VITALS — SYSTOLIC BLOOD PRESSURE: 122 MMHG | DIASTOLIC BLOOD PRESSURE: 56 MMHG

## 2020-04-18 VITALS — SYSTOLIC BLOOD PRESSURE: 133 MMHG | DIASTOLIC BLOOD PRESSURE: 68 MMHG

## 2020-04-18 VITALS — DIASTOLIC BLOOD PRESSURE: 68 MMHG | SYSTOLIC BLOOD PRESSURE: 134 MMHG

## 2020-04-18 VITALS — DIASTOLIC BLOOD PRESSURE: 76 MMHG | SYSTOLIC BLOOD PRESSURE: 146 MMHG

## 2020-04-18 VITALS — SYSTOLIC BLOOD PRESSURE: 119 MMHG | DIASTOLIC BLOOD PRESSURE: 63 MMHG

## 2020-04-18 VITALS — DIASTOLIC BLOOD PRESSURE: 74 MMHG | SYSTOLIC BLOOD PRESSURE: 141 MMHG

## 2020-04-18 VITALS — DIASTOLIC BLOOD PRESSURE: 69 MMHG | SYSTOLIC BLOOD PRESSURE: 146 MMHG

## 2020-04-18 VITALS — DIASTOLIC BLOOD PRESSURE: 52 MMHG | SYSTOLIC BLOOD PRESSURE: 105 MMHG

## 2020-04-18 VITALS — SYSTOLIC BLOOD PRESSURE: 125 MMHG | DIASTOLIC BLOOD PRESSURE: 57 MMHG

## 2020-04-18 VITALS — SYSTOLIC BLOOD PRESSURE: 138 MMHG | DIASTOLIC BLOOD PRESSURE: 71 MMHG

## 2020-04-18 VITALS — DIASTOLIC BLOOD PRESSURE: 70 MMHG | SYSTOLIC BLOOD PRESSURE: 146 MMHG

## 2020-04-18 VITALS — DIASTOLIC BLOOD PRESSURE: 70 MMHG | SYSTOLIC BLOOD PRESSURE: 135 MMHG

## 2020-04-18 VITALS — SYSTOLIC BLOOD PRESSURE: 120 MMHG | DIASTOLIC BLOOD PRESSURE: 58 MMHG

## 2020-04-18 VITALS — SYSTOLIC BLOOD PRESSURE: 124 MMHG | DIASTOLIC BLOOD PRESSURE: 62 MMHG

## 2020-04-18 VITALS — DIASTOLIC BLOOD PRESSURE: 46 MMHG | SYSTOLIC BLOOD PRESSURE: 106 MMHG

## 2020-04-18 VITALS — SYSTOLIC BLOOD PRESSURE: 129 MMHG | DIASTOLIC BLOOD PRESSURE: 62 MMHG

## 2020-04-18 VITALS — DIASTOLIC BLOOD PRESSURE: 57 MMHG | SYSTOLIC BLOOD PRESSURE: 119 MMHG

## 2020-04-18 VITALS — DIASTOLIC BLOOD PRESSURE: 55 MMHG | SYSTOLIC BLOOD PRESSURE: 106 MMHG

## 2020-04-18 VITALS — SYSTOLIC BLOOD PRESSURE: 119 MMHG | DIASTOLIC BLOOD PRESSURE: 57 MMHG

## 2020-04-18 LAB
ALBUMIN SERPL-MCNC: 2 GM/DL (ref 3.2–4.5)
ALP SERPL-CCNC: 53 U/L (ref 40–136)
ALT SERPL-CCNC: 18 U/L (ref 0–55)
ARTERIAL PATENCY WRIST A: (no result)
BASE EXCESS STD BLDA CALC-SCNC: -1.7 MMOL/L (ref -2.5–2.5)
BASOPHILS # BLD AUTO: 0.1 10^3/UL (ref 0–0.1)
BASOPHILS NFR BLD AUTO: 1 % (ref 0–10)
BDY SITE: (no result)
BILIRUB SERPL-MCNC: 0.3 MG/DL (ref 0.1–1)
BODY TEMPERATURE: 37
BUN/CREAT SERPL: 32
CALCIUM SERPL-MCNC: 7.6 MG/DL (ref 8.5–10.1)
CHLORIDE SERPL-SCNC: 109 MMOL/L (ref 98–107)
CO2 BLDA CALC-SCNC: 24.4 MMOL/L (ref 21–31)
CO2 SERPL-SCNC: 21 MMOL/L (ref 21–32)
CREAT SERPL-MCNC: 0.9 MG/DL (ref 0.6–1.3)
EOSINOPHIL # BLD AUTO: 1.5 10^3/UL (ref 0–0.3)
EOSINOPHIL NFR BLD AUTO: 10 % (ref 0–10)
ERYTHROCYTE [DISTWIDTH] IN BLOOD BY AUTOMATED COUNT: 14.9 % (ref 10–14.5)
GFR SERPLBLD BASED ON 1.73 SQ M-ARVRAT: > 60 ML/MIN
GLUCOSE SERPL-MCNC: 71 MG/DL (ref 70–105)
HCT VFR BLD CALC: 27 % (ref 40–54)
HCT VFR BLD CALC: 30 % (ref 40–54)
HGB BLD-MCNC: 8.3 G/DL (ref 13.3–17.7)
HGB BLD-MCNC: 9.6 G/DL (ref 13.3–17.7)
INHALED O2 FLOW RATE: (no result) L/MIN
LYMPHOCYTES # BLD AUTO: 0.7 X 10^3 (ref 1–4)
LYMPHOCYTES NFR BLD AUTO: 4 % (ref 12–44)
MAGNESIUM SERPL-MCNC: 1.8 MG/DL (ref 1.6–2.4)
MANUAL DIFFERENTIAL PERFORMED BLD QL: NO
MCH RBC QN AUTO: 29 PG (ref 25–34)
MCHC RBC AUTO-ENTMCNC: 31 G/DL (ref 32–36)
MCV RBC AUTO: 93 FL (ref 80–99)
MONOCYTES # BLD AUTO: 1.6 X 10^3 (ref 0–1)
MONOCYTES NFR BLD AUTO: 11 % (ref 0–12)
NEUTROPHILS # BLD AUTO: 11.2 X 10^3 (ref 1.8–7.8)
NEUTROPHILS NFR BLD AUTO: 74 % (ref 42–75)
PCO2 BLDA: 43 MMHG (ref 35–45)
PH BLDA: 7.35 [PH] (ref 7.37–7.43)
PHOSPHATE SERPL-MCNC: 2.7 MG/DL (ref 2.3–4.7)
PLATELET # BLD: 369 10^3/UL (ref 130–400)
PMV BLD AUTO: 9.8 FL (ref 7.4–10.4)
PO2 BLDA: 80 MMHG (ref 79–93)
POTASSIUM SERPL-SCNC: 3.7 MMOL/L (ref 3.6–5)
PROT SERPL-MCNC: 4.5 GM/DL (ref 6.4–8.2)
SAO2 % BLDA FROM PO2: 95 % (ref 94–100)
SODIUM SERPL-SCNC: 140 MMOL/L (ref 135–145)
VENTILATION MODE VENT: YES
WBC # BLD AUTO: 15 10^3/UL (ref 4.3–11)

## 2020-04-18 RX ADMIN — PANTOPRAZOLE SODIUM SCH MG: 40 INJECTION, POWDER, FOR SOLUTION INTRAVENOUS at 21:39

## 2020-04-18 RX ADMIN — PANTOPRAZOLE SODIUM SCH MG: 40 INJECTION, POWDER, FOR SOLUTION INTRAVENOUS at 08:13

## 2020-04-18 RX ADMIN — METHYLPREDNISOLONE SODIUM SUCCINATE SCH MG: 40 INJECTION, POWDER, FOR SOLUTION INTRAMUSCULAR; INTRAVENOUS at 11:33

## 2020-04-18 RX ADMIN — MICONAZOLE NITRATE SCH APPLIC: 20 POWDER TOPICAL at 08:14

## 2020-04-18 RX ADMIN — INSULIN ASPART SCH UNIT: 100 INJECTION, SOLUTION INTRAVENOUS; SUBCUTANEOUS at 11:33

## 2020-04-18 RX ADMIN — Medication SCH MLS/HR: at 15:15

## 2020-04-18 RX ADMIN — MICONAZOLE NITRATE SCH APPLIC: 20 POWDER TOPICAL at 21:40

## 2020-04-18 RX ADMIN — SODIUM CHLORIDE SCH MLS/HR: 900 INJECTION, SOLUTION INTRAVENOUS at 06:42

## 2020-04-18 RX ADMIN — DEXMEDETOMIDINE HYDROCHLORIDE SCH MLS/HR: 100 INJECTION, SOLUTION, CONCENTRATE INTRAVENOUS at 18:18

## 2020-04-18 RX ADMIN — SODIUM CHLORIDE SCH MLS/HR: 900 INJECTION, SOLUTION INTRAVENOUS at 15:14

## 2020-04-18 RX ADMIN — IPRATROPIUM BROMIDE AND ALBUTEROL SULFATE SCH ML: .5; 3 SOLUTION RESPIRATORY (INHALATION) at 10:24

## 2020-04-18 RX ADMIN — METHYLPREDNISOLONE SODIUM SUCCINATE SCH MG: 40 INJECTION, POWDER, FOR SOLUTION INTRAMUSCULAR; INTRAVENOUS at 19:49

## 2020-04-18 RX ADMIN — SODIUM CHLORIDE SCH MLS/HR: 900 INJECTION, SOLUTION INTRAVENOUS at 00:33

## 2020-04-18 RX ADMIN — SODIUM CHLORIDE SCH MLS/HR: 900 INJECTION, SOLUTION INTRAVENOUS at 08:14

## 2020-04-18 RX ADMIN — DEXMEDETOMIDINE HYDROCHLORIDE SCH MLS/HR: 100 INJECTION, SOLUTION, CONCENTRATE INTRAVENOUS at 09:52

## 2020-04-18 RX ADMIN — INSULIN ASPART SCH UNIT: 100 INJECTION, SOLUTION INTRAVENOUS; SUBCUTANEOUS at 23:34

## 2020-04-18 RX ADMIN — SODIUM CHLORIDE SCH MLS/HR: 900 INJECTION, SOLUTION INTRAVENOUS at 18:17

## 2020-04-18 RX ADMIN — IPRATROPIUM BROMIDE AND ALBUTEROL SULFATE SCH ML: .5; 3 SOLUTION RESPIRATORY (INHALATION) at 22:44

## 2020-04-18 RX ADMIN — POTASSIUM CHLORIDE SCH MEQ: 1500 TABLET, EXTENDED RELEASE ORAL at 04:19

## 2020-04-18 RX ADMIN — POTASSIUM CHLORIDE SCH MLS/HR: 200 INJECTION, SOLUTION INTRAVENOUS at 04:18

## 2020-04-18 RX ADMIN — Medication SCH MLS/HR: at 04:12

## 2020-04-18 RX ADMIN — Medication SCH MLS/HR: at 19:49

## 2020-04-18 RX ADMIN — MAGNESIUM SULFATE IN DEXTROSE SCH MLS/HR: 10 INJECTION, SOLUTION INTRAVENOUS at 04:18

## 2020-04-18 RX ADMIN — SODIUM CHLORIDE SCH MLS/HR: 900 INJECTION, SOLUTION INTRAVENOUS at 21:39

## 2020-04-18 RX ADMIN — IPRATROPIUM BROMIDE AND ALBUTEROL SULFATE SCH ML: .5; 3 SOLUTION RESPIRATORY (INHALATION) at 14:31

## 2020-04-18 RX ADMIN — INSULIN ASPART SCH UNIT: 100 INJECTION, SOLUTION INTRAVENOUS; SUBCUTANEOUS at 06:42

## 2020-04-18 RX ADMIN — IPRATROPIUM BROMIDE AND ALBUTEROL SULFATE SCH ML: .5; 3 SOLUTION RESPIRATORY (INHALATION) at 07:31

## 2020-04-18 RX ADMIN — SODIUM CHLORIDE SCH MLS/HR: 900 INJECTION, SOLUTION INTRAVENOUS at 08:13

## 2020-04-18 RX ADMIN — INSULIN ASPART SCH UNIT: 100 INJECTION, SOLUTION INTRAVENOUS; SUBCUTANEOUS at 18:17

## 2020-04-18 NOTE — NUR
Dr. Peña notified at this time of pt's RR 42, Tidal volumes 200s, and O2 saturation 88%. 
Orders received to place pt back on sedation and AC mode at this time. RT notified.

## 2020-04-18 NOTE — PROGRESS NOTE
Subjective


Subjective


Date Seen by Provider:  2020


Time Seen by Provider:  11:45


86 yo M


-  pt placed back on AC vent and sedation.


-lab called this AM and reported gram neg cocci on 1 blood culture- possible 

hemophilus.  being sent out for further testing.


-OG putting out blood





Review of Systems


ROS Unable to Obtain:  intubated, sedated





All Other Systems Reviewed


All Other Systems Reviewed:  Yes





Objective


Exam


Vital Signs





Vital Signs








  Date Time  Temp Pulse Resp B/P (MAP) Pulse Ox O2 Delivery O2 Flow Rate FiO2


 


20 11:00  90 24 115/59 (77) 93 Mechanical Ventilator 40.00 


 


20 10:25  91 21  94   40


 


20 10:00  96 27 136/76 (96) 92 Mechanical Ventilator 40.00 


 


20 09:52  98  122/69    


 


20 09:04  94 35  91   40


 


20 09:00  94 35 119/57 (77) 92 Mechanical Ventilator 40.00 


 


20 08:00 37.2       


 


20 08:00      Mechanical Ventilator  40


 


20 08:00  113 43 138/71 (93) 94 Mechanical Ventilator 40.00 


 


20 07:38  97 28  95   40


 


20 07:00  94 22 106/55 (72) 92 Mechanical Ventilator 40.00 


 


20 07:00  104      


 


20 06:00  97 23 124/62 (82) 92 Mechanical Ventilator 40.00 


 


20 05:00  96 23 113/57 (75) 92 Mechanical Ventilator 40.00 


 


20 04:15 36.7       


 


20 04:00  98 23 105/52 (69) 92 Mechanical Ventilator 40.00 


 


20 03:47      Mechanical Ventilator  40


 


20 03:46  98  97/73    


 


20 03:37  97 24  95   40


 


20 03:00  94 24 120/58 (78) 93 Mechanical Ventilator 40.00 


 


20 02:00  95 23 125/57 (79) 93 Mechanical Ventilator 40.00 


 


20 01:00  95      


 


20 01:00  93 23 122/56 (78) 93 Mechanical Ventilator 40.00 


 


20 00:00  95 23 112/56 (74) 93 Mechanical Ventilator 40.00 


 


20 23:34      Mechanical Ventilator  70


 


20 23:32 37.0       


 


20 23:32      Mechanical Ventilator 40.00 


 


20 23:25  94 24  93   40


 


20 23:00  99 25 117/56 (76) 94 Mechanical Ventilator 50.00 


 


20 22:58  99  120/60    


 


20 22:00  92 24 112/60 (77) 94 Mechanical Ventilator 50.00 


 


20 21:00  91 24 134/58 (83) 97 Mechanical Ventilator 50.00 


 


20 20:24  79  93/43    


 


20 20:00      Mechanical Ventilator  70


 


20 20:00  85 24 108/50 (69) 94 Mechanical Ventilator 50.00 


 


20 19:53  87  121/55    


 


20 19:51 35.7       


 


20 19:45  92  126/54    


 


20 19:29  90 24  95   50


 


20 19:00  92      


 


20 19:00  91 26 122/51 (74) 90 Mechanical Ventilator 50.00 


 


20 19:00  92  102/75    


 


20 18:00  87 17 122/52 (75) 98 Mechanical Ventilator 70.00 


 


20 17:00  76 23 106/47 (66) 98 Mechanical Ventilator 70.00 


 


20 16:00  74 19 108/50 (69) 99 Mechanical Ventilator 70.00 


 


20 16:00     98 Mechanical Ventilator  70


 


20 15:28  80  121/57    


 


20 15:00  79 24 125/65 (85) 100 Mechanical Ventilator 70.00 


 


20 14:42  83 24  97   50


 


20 14:00  81 30 124/56 (78) 100 Mechanical Ventilator 70.00 


 


20 13:00  86 21 119/56 (77) 100 Mechanical Ventilator 70.00 


 


20 12:45  88      


 


20 12:00     98 Mechanical Ventilator  70


 


20 12:00  96 12 121/54 (76) 97 Mechanical Ventilator 70.00 


 


20 11:56  97  134/76    














I & O 


 


 20





 07:00


 


Intake Total 5035 ml


 


Output Total 1800 ml


 


Balance 3235 ml








General Appearance:  No Apparent Distress, WD/WN, Other (Intubated and sedated)


Eyes:  Bilateral Eye PERRL, Bilateral Eye Other (Bilateral mattering mild)


HEENT:  Other (Endotracheal tube in place, OG tube in place)


Neck:  Normal Inspection, Supple


Respiratory:  No Respiratory Distress, Crackles, Rhonci


Cardiovascular:  Regular Rate, Rhythm, No Edema


Gastrointestinal:  Normal Bowel Sounds, Soft


Extremity:  Normal Inspection, Non Tender, No Pedal Edema


Neurologic/Psychiatric:  Other (sedated)


Skin:  Normal Color, Warm/Dry





Results


Lab


Laboratory Tests


20 13:45: 


20 13:46: Glucometer 86


20 17:56: Glucometer 79


20 23:30: Glucometer 77


20 02:20: 


White Blood Count 15.0H, Red Blood Count 2.88L, Hemoglobin 8.3L, Hematocrit 27L,

Mean Corpuscular Volume 93, Mean Corpuscular Hemoglobin 29, Mean Corpuscular 

Hemoglobin Concent 31L, Red Cell Distribution Width 14.9H, Platelet Count 369, 

Mean Platelet Volume 9.8, Neutrophils (%) (Auto) 74, Lymphocytes (%) (Auto) 4L, 

Monocytes (%) (Auto) 11, Eosinophils (%) (Auto) 10, Basophils (%) (Auto) 1, 

Neutrophils # (Auto) 11.2H, Lymphocytes # (Auto) 0.7L, Monocytes # (Auto) 1.6H, 

Eosinophils # (Auto) 1.5H, Basophils # (Auto) 0.1, Blood Gas Puncture Site LEFT 

RADIAL, Blood Gas Patient Temperature 37.0, Arterial Blood pH 7.35L, Arterial 

Blood Partial Pressure CO2 43, Arterial Blood Partial Pressure O2 80, Arterial 

Blood HCO3 23, Arterial Blood Total CO2 24.4, Arterial Blood Oxygen Saturation 

95, Arterial Blood Base Excess -1.7, Hector Test YES-POS, Blood Gas Ventilator Se

tting YES, Blood Gas Inspired Oxygen 40%, Sodium Level 140, Potassium Level 3.7,

Chloride Level 109H, Carbon Dioxide Level 21, Anion Gap 10, Blood Urea Nitrogen 

29H, Creatinine 0.90, Estimat Glomerular Filtration Rate > 60, BUN/Creatinine 

Ratio 32, Glucose Level 71, Calcium Level 7.6L, Corrected Calcium 9.2, 

Phosphorus Level 2.7, Magnesium Level 1.8, Total Bilirubin 0.3, Aspartate Amino 

Transf (AST/SGOT) 19, Alanine Aminotransferase (ALT/SGPT) 18, Alkaline 

Phosphatase 53, Total Protein 4.5L, Albumin 2.0L





Microbiology


20 Gram Stain - Final, Resulted


          


20 Sputum Culture - Preliminary, Resulted


          


20 Blood Culture - Preliminary, Resulted


          Gram Negative Coccobacillus





Assessment/Plan


Assessment/Plan


Assessment and Plan


20   weaning trial started but rr increased and oxygen saturation declined 

he was placed back on AC and sedated


OG tube is bringing up bloody secretions    -lovenox held.   Pt is on PPI BID IV

        monitoring Hgb.


Continue Vanc/zosyn/azithromycin-    1 blood culture is going gram neg-  sent 

off to [facility]


blood pressure is better-  pressors are off.


-steroids started for his COPD and respiratory failure- but steroids will not be

good for his GI bleed.    


Dr. Peña consulted.








Dispo:  guarded.


Problems:  


(1) Pneumonia


(2) Acute on chronic respiratory failure with hypoxia and hypercapnia


(3) Acute kidney injury


Assessment & Plan:  improved





(4) COPD with acute lower respiratory infection


(5) GI bleed


Assessment & Plan:  trending hgb-   does not appear to be a brisk bleed- could 

be gastritis along with irritation from placement of OG.








Clinical Quality Measures


DVT/VTE Risk/Contraindication:


Risk Factor Score Per Nursin


RFS Level Per Nursing on Admit:  4+=Very High











ISAI RAGSDALE MD              2020 11:30

## 2020-04-18 NOTE — PULMONARY PROGRESS NOTE
Subjective


Time Seen by a Provider:  05:36


Subjective/Events-last exam


sedated on vent





Sepsis Event


Evaluation


Height, Weight, BMI


Height: '"


Weight: lbs. oz. kg; 27.00 BMI


Method:





Focused Exam


Lactate Level


4/17/20 00:50: Lactic Acid Level 0.79





Exam


Exam





Vital Signs








  Date Time  Temp Pulse Resp B/P (MAP) Pulse Ox O2 Delivery O2 Flow Rate FiO2


 


4/18/20 05:00  96 23 113/57 (75) 92 Mechanical Ventilator 40.00 


 


4/18/20 04:15 36.7       


 


4/18/20 04:00  98 23 105/52 (69) 92 Mechanical Ventilator 40.00 


 


4/18/20 03:47      Mechanical Ventilator  40


 


4/18/20 03:46  98  97/73    


 


4/18/20 03:37  97 24  95   40


 


4/18/20 03:00  94 24 120/58 (78) 93 Mechanical Ventilator 40.00 


 


4/18/20 02:00  95 23 125/57 (79) 93 Mechanical Ventilator 40.00 


 


4/18/20 01:00  95      


 


4/18/20 01:00  93 23 122/56 (78) 93 Mechanical Ventilator 40.00 


 


4/18/20 00:00  95 23 112/56 (74) 93 Mechanical Ventilator 40.00 


 


4/17/20 23:34      Mechanical Ventilator  70


 


4/17/20 23:32 37.0       


 


4/17/20 23:32      Mechanical Ventilator 40.00 


 


4/17/20 23:25  94 24  93   40


 


4/17/20 23:00  99 25 117/56 (76) 94 Mechanical Ventilator 50.00 


 


4/17/20 22:58  99  120/60    


 


4/17/20 22:00  92 24 112/60 (77) 94 Mechanical Ventilator 50.00 


 


4/17/20 21:00  91 24 134/58 (83) 97 Mechanical Ventilator 50.00 


 


4/17/20 20:24  79  93/43    


 


4/17/20 20:00      Mechanical Ventilator  70


 


4/17/20 20:00  85 24 108/50 (69) 94 Mechanical Ventilator 50.00 


 


4/17/20 19:53  87  121/55    


 


4/17/20 19:51 35.7       


 


4/17/20 19:45  92  126/54    


 


4/17/20 19:29  90 24  95   50


 


4/17/20 19:00  92      


 


4/17/20 19:00  91 26 122/51 (74) 90 Mechanical Ventilator 50.00 


 


4/17/20 19:00  92  102/75    


 


4/17/20 18:00  87 17 122/52 (75) 98 Mechanical Ventilator 70.00 


 


4/17/20 17:00  76 23 106/47 (66) 98 Mechanical Ventilator 70.00 


 


4/17/20 16:00  74 19 108/50 (69) 99 Mechanical Ventilator 70.00 


 


4/17/20 16:00     98 Mechanical Ventilator  70


 


4/17/20 15:28  80  121/57    


 


4/17/20 15:00  79 24 125/65 (85) 100 Mechanical Ventilator 70.00 


 


4/17/20 14:42  83 24  97   50


 


4/17/20 14:00  81 30 124/56 (78) 100 Mechanical Ventilator 70.00 


 


4/17/20 13:00  86 21 119/56 (77) 100 Mechanical Ventilator 70.00 


 


4/17/20 12:45  88      


 


4/17/20 12:00     98 Mechanical Ventilator  70


 


4/17/20 12:00  96 12 121/54 (76) 97 Mechanical Ventilator 70.00 


 


4/17/20 11:56  97  134/76    


 


4/17/20 11:00  94 23 126/59 (81) 99 Mechanical Ventilator 70.00 


 


4/17/20 10:47  95 24  100   50


 


4/17/20 10:00  93 16 130/62 (84) 100 Mechanical Ventilator 70.00 


 


4/17/20 09:00  96 23 127/60 (82) 100 Mechanical Ventilator 70.00 


 


4/17/20 08:45 36.8       


 


4/17/20 08:23  97  133/63    


 


4/17/20 08:00     98 Mechanical Ventilator  70


 


4/17/20 08:00  96 24 129/61 (83) 99 Mechanical Ventilator 70.00 


 


4/17/20 07:10  98 24  100   60


 


4/17/20 07:00  96 24 129/59 (82) 100 Mechanical Ventilator 70.00 


 


4/17/20 06:40  102      


 


4/17/20 06:17  86  108/54    


 


4/17/20 05:45  83 24 81/44 (56) 100 Mechanical Ventilator 70.00 














I & O 


 


 4/18/20





 07:00


 


Intake Total 4670 ml


 


Output Total 1450 ml


 


Balance 3220 ml








Height & Weight


Height: '"


Weight: lbs. oz. kg; 27.00 BMI


Method:


General Appearance:  No Apparent Distress, WD/WN, Other (Intubated and sedated)


HEENT:  Other (Endotracheal tube in place, OG tube in place)


Neck:  Normal Inspection, Supple


Respiratory:  No Respiratory Distress, Crackles, Rhonci


Cardiovascular:  Regular Rate, Rhythm, No Edema


Capillary Refill:  Less Than 3 Seconds


Gastrointestinal:  normal bowel sounds, non tender, soft, other (Ventral hernia)


Extremity:  Normal Inspection, Non Tender, No Pedal Edema


Neurologic/Psychiatric:  Other (sedated)


Skin:  Normal Color, Warm/Dry





Results


Lab


Laboratory Tests


4/17/20 00:50








4/17/20 08:15








4/18/20 02:20











Assessment/Plan


Assessment/Plan


Acute respiratory failure with pneumonia with Severe sepsis - question of 

aspiration 


   -Change Abx to Vanco, Zosyn and azithromycin d/c cefepime 


   -Continue vent 


      -Will do vent weaning trial. 


   -Start duoneb and solumedrol 


   -Pan cultures pending 


   -influenza is negative 


   -Check RVP 


   -COVID is neg


Acute renal failure 


   -IVF 


   -monitor 


Hypotension


   -Titrate to d/c  Levophed 


   -Give another liter bolus of LR 


Dehydration 


   -IVF 


Anemia 


   -Monitor











LENORA SLADE DO              Apr 18, 2020 05:41

## 2020-04-18 NOTE — DIAGNOSTIC IMAGING REPORT
INDICATION: Respiratory failure.



Time of exam 3:41 AM



Correlation is made with prior chest one day earlier.



Support lines and catheters remain in place. Patchy bilateral

pulmonary infiltrates persist. There is a small left effusion. No

pneumothorax is seen.



IMPRESSION: Continued bilateral pulmonary infiltrates and small

left effusion.



Dictated by: 



  Dictated on workstation # [id number]

## 2020-04-19 VITALS — SYSTOLIC BLOOD PRESSURE: 147 MMHG | DIASTOLIC BLOOD PRESSURE: 57 MMHG

## 2020-04-19 VITALS — DIASTOLIC BLOOD PRESSURE: 63 MMHG | SYSTOLIC BLOOD PRESSURE: 129 MMHG

## 2020-04-19 VITALS — SYSTOLIC BLOOD PRESSURE: 131 MMHG | DIASTOLIC BLOOD PRESSURE: 55 MMHG

## 2020-04-19 VITALS — DIASTOLIC BLOOD PRESSURE: 73 MMHG | SYSTOLIC BLOOD PRESSURE: 124 MMHG

## 2020-04-19 VITALS — DIASTOLIC BLOOD PRESSURE: 76 MMHG | SYSTOLIC BLOOD PRESSURE: 130 MMHG

## 2020-04-19 VITALS — SYSTOLIC BLOOD PRESSURE: 126 MMHG | DIASTOLIC BLOOD PRESSURE: 63 MMHG

## 2020-04-19 VITALS — DIASTOLIC BLOOD PRESSURE: 105 MMHG | SYSTOLIC BLOOD PRESSURE: 123 MMHG

## 2020-04-19 VITALS — SYSTOLIC BLOOD PRESSURE: 128 MMHG | DIASTOLIC BLOOD PRESSURE: 63 MMHG

## 2020-04-19 VITALS — DIASTOLIC BLOOD PRESSURE: 65 MMHG | SYSTOLIC BLOOD PRESSURE: 122 MMHG

## 2020-04-19 VITALS — DIASTOLIC BLOOD PRESSURE: 66 MMHG | SYSTOLIC BLOOD PRESSURE: 124 MMHG

## 2020-04-19 VITALS — DIASTOLIC BLOOD PRESSURE: 70 MMHG | SYSTOLIC BLOOD PRESSURE: 129 MMHG

## 2020-04-19 VITALS — SYSTOLIC BLOOD PRESSURE: 130 MMHG | DIASTOLIC BLOOD PRESSURE: 68 MMHG

## 2020-04-19 VITALS — SYSTOLIC BLOOD PRESSURE: 139 MMHG | DIASTOLIC BLOOD PRESSURE: 70 MMHG

## 2020-04-19 VITALS — SYSTOLIC BLOOD PRESSURE: 138 MMHG | DIASTOLIC BLOOD PRESSURE: 71 MMHG

## 2020-04-19 VITALS — DIASTOLIC BLOOD PRESSURE: 70 MMHG | SYSTOLIC BLOOD PRESSURE: 137 MMHG

## 2020-04-19 VITALS — DIASTOLIC BLOOD PRESSURE: 69 MMHG | SYSTOLIC BLOOD PRESSURE: 134 MMHG

## 2020-04-19 VITALS — SYSTOLIC BLOOD PRESSURE: 119 MMHG | DIASTOLIC BLOOD PRESSURE: 54 MMHG

## 2020-04-19 VITALS — DIASTOLIC BLOOD PRESSURE: 69 MMHG | SYSTOLIC BLOOD PRESSURE: 142 MMHG

## 2020-04-19 VITALS — SYSTOLIC BLOOD PRESSURE: 125 MMHG | DIASTOLIC BLOOD PRESSURE: 56 MMHG

## 2020-04-19 VITALS — SYSTOLIC BLOOD PRESSURE: 141 MMHG | DIASTOLIC BLOOD PRESSURE: 67 MMHG

## 2020-04-19 VITALS — DIASTOLIC BLOOD PRESSURE: 64 MMHG | SYSTOLIC BLOOD PRESSURE: 130 MMHG

## 2020-04-19 VITALS — SYSTOLIC BLOOD PRESSURE: 126 MMHG | DIASTOLIC BLOOD PRESSURE: 64 MMHG

## 2020-04-19 VITALS — SYSTOLIC BLOOD PRESSURE: 127 MMHG | DIASTOLIC BLOOD PRESSURE: 61 MMHG

## 2020-04-19 VITALS — SYSTOLIC BLOOD PRESSURE: 122 MMHG | DIASTOLIC BLOOD PRESSURE: 64 MMHG

## 2020-04-19 VITALS — SYSTOLIC BLOOD PRESSURE: 147 MMHG | DIASTOLIC BLOOD PRESSURE: 71 MMHG

## 2020-04-19 VITALS — DIASTOLIC BLOOD PRESSURE: 61 MMHG | SYSTOLIC BLOOD PRESSURE: 111 MMHG

## 2020-04-19 VITALS — DIASTOLIC BLOOD PRESSURE: 67 MMHG | SYSTOLIC BLOOD PRESSURE: 141 MMHG

## 2020-04-19 LAB
ARTERIAL PATENCY WRIST A: (no result)
BASE EXCESS STD BLDA CALC-SCNC: -4.9 MMOL/L (ref -2.5–2.5)
BASOPHILS # BLD AUTO: 0 10^3/UL (ref 0–0.1)
BASOPHILS NFR BLD AUTO: 0 % (ref 0–10)
BDY SITE: (no result)
BODY TEMPERATURE: 35.8
BUN/CREAT SERPL: 34
CALCIUM SERPL-MCNC: 8.2 MG/DL (ref 8.5–10.1)
CHLORIDE SERPL-SCNC: 113 MMOL/L (ref 98–107)
CO2 BLDA CALC-SCNC: 20.2 MMOL/L (ref 21–31)
CO2 SERPL-SCNC: 18 MMOL/L (ref 21–32)
CREAT SERPL-MCNC: 1.08 MG/DL (ref 0.6–1.3)
EOSINOPHIL # BLD AUTO: 0 10^3/UL (ref 0–0.3)
EOSINOPHIL NFR BLD AUTO: 0 % (ref 0–10)
ERYTHROCYTE [DISTWIDTH] IN BLOOD BY AUTOMATED COUNT: 15 % (ref 10–14.5)
GFR SERPLBLD BASED ON 1.73 SQ M-ARVRAT: > 60 ML/MIN
GLUCOSE SERPL-MCNC: 171 MG/DL (ref 70–105)
HCT VFR BLD CALC: 30 % (ref 40–54)
HGB BLD-MCNC: 9.3 G/DL (ref 13.3–17.7)
INHALED O2 FLOW RATE: (no result) L/MIN
LYMPHOCYTES # BLD AUTO: 0.7 X 10^3 (ref 1–4)
LYMPHOCYTES NFR BLD AUTO: 7 % (ref 12–44)
MAGNESIUM SERPL-MCNC: 2 MG/DL (ref 1.6–2.4)
MANUAL DIFFERENTIAL PERFORMED BLD QL: NO
MCH RBC QN AUTO: 28 PG (ref 25–34)
MCHC RBC AUTO-ENTMCNC: 31 G/DL (ref 32–36)
MCV RBC AUTO: 90 FL (ref 80–99)
MONOCYTES # BLD AUTO: 0.3 X 10^3 (ref 0–1)
MONOCYTES NFR BLD AUTO: 3 % (ref 0–12)
NEUTROPHILS # BLD AUTO: 9.4 X 10^3 (ref 1.8–7.8)
NEUTROPHILS NFR BLD AUTO: 90 % (ref 42–75)
PCO2 BLDA: 31 MMHG (ref 35–45)
PH BLDA: 7.4 [PH] (ref 7.37–7.43)
PHOSPHATE SERPL-MCNC: 4.6 MG/DL (ref 2.3–4.7)
PLATELET # BLD: 389 10^3/UL (ref 130–400)
PMV BLD AUTO: 9.7 FL (ref 7.4–10.4)
PO2 BLDA: 59 MMHG (ref 79–93)
POTASSIUM SERPL-SCNC: 4.4 MMOL/L (ref 3.6–5)
SAO2 % BLDA FROM PO2: 91 % (ref 94–100)
SODIUM SERPL-SCNC: 145 MMOL/L (ref 135–145)
VENTILATION MODE VENT: YES
WBC # BLD AUTO: 10.5 10^3/UL (ref 4.3–11)

## 2020-04-19 RX ADMIN — METHYLPREDNISOLONE SODIUM SUCCINATE SCH MG: 40 INJECTION, POWDER, FOR SOLUTION INTRAMUSCULAR; INTRAVENOUS at 00:19

## 2020-04-19 RX ADMIN — IPRATROPIUM BROMIDE AND ALBUTEROL SULFATE SCH ML: .5; 3 SOLUTION RESPIRATORY (INHALATION) at 19:12

## 2020-04-19 RX ADMIN — INSULIN ASPART SCH UNIT: 100 INJECTION, SOLUTION INTRAVENOUS; SUBCUTANEOUS at 06:02

## 2020-04-19 RX ADMIN — IPRATROPIUM BROMIDE AND ALBUTEROL SULFATE SCH ML: .5; 3 SOLUTION RESPIRATORY (INHALATION) at 09:39

## 2020-04-19 RX ADMIN — Medication SCH MLS/HR: at 03:28

## 2020-04-19 RX ADMIN — MICONAZOLE NITRATE SCH APPLIC: 20 POWDER TOPICAL at 08:24

## 2020-04-19 RX ADMIN — SODIUM CHLORIDE SCH MLS/HR: 900 INJECTION, SOLUTION INTRAVENOUS at 21:18

## 2020-04-19 RX ADMIN — INSULIN ASPART SCH UNIT: 100 INJECTION, SOLUTION INTRAVENOUS; SUBCUTANEOUS at 18:19

## 2020-04-19 RX ADMIN — IPRATROPIUM BROMIDE AND ALBUTEROL SULFATE SCH ML: .5; 3 SOLUTION RESPIRATORY (INHALATION) at 06:31

## 2020-04-19 RX ADMIN — POTASSIUM CHLORIDE SCH MEQ: 1500 TABLET, EXTENDED RELEASE ORAL at 05:47

## 2020-04-19 RX ADMIN — METHYLPREDNISOLONE SODIUM SUCCINATE SCH MG: 40 INJECTION, POWDER, FOR SOLUTION INTRAMUSCULAR; INTRAVENOUS at 19:48

## 2020-04-19 RX ADMIN — SODIUM CHLORIDE SCH MLS/HR: 900 INJECTION, SOLUTION INTRAVENOUS at 14:39

## 2020-04-19 RX ADMIN — METHYLPREDNISOLONE SODIUM SUCCINATE SCH MG: 40 INJECTION, POWDER, FOR SOLUTION INTRAMUSCULAR; INTRAVENOUS at 11:45

## 2020-04-19 RX ADMIN — SODIUM CHLORIDE SCH MLS/HR: 900 INJECTION, SOLUTION INTRAVENOUS at 05:46

## 2020-04-19 RX ADMIN — MICONAZOLE NITRATE SCH APPLIC: 20 POWDER TOPICAL at 21:17

## 2020-04-19 RX ADMIN — MAGNESIUM SULFATE IN DEXTROSE SCH MLS/HR: 10 INJECTION, SOLUTION INTRAVENOUS at 05:47

## 2020-04-19 RX ADMIN — INSULIN ASPART SCH UNIT: 100 INJECTION, SOLUTION INTRAVENOUS; SUBCUTANEOUS at 11:17

## 2020-04-19 RX ADMIN — PANTOPRAZOLE SODIUM SCH MG: 40 INJECTION, POWDER, FOR SOLUTION INTRAVENOUS at 08:22

## 2020-04-19 RX ADMIN — METHYLPREDNISOLONE SODIUM SUCCINATE SCH MG: 40 INJECTION, POWDER, FOR SOLUTION INTRAMUSCULAR; INTRAVENOUS at 05:56

## 2020-04-19 RX ADMIN — POTASSIUM CHLORIDE SCH MLS/HR: 200 INJECTION, SOLUTION INTRAVENOUS at 05:47

## 2020-04-19 RX ADMIN — PANTOPRAZOLE SODIUM SCH MG: 40 INJECTION, POWDER, FOR SOLUTION INTRAVENOUS at 21:16

## 2020-04-19 RX ADMIN — IPRATROPIUM BROMIDE AND ALBUTEROL SULFATE SCH ML: .5; 3 SOLUTION RESPIRATORY (INHALATION) at 14:20

## 2020-04-19 RX ADMIN — IPRATROPIUM BROMIDE AND ALBUTEROL SULFATE SCH ML: .5; 3 SOLUTION RESPIRATORY (INHALATION) at 02:51

## 2020-04-19 RX ADMIN — SODIUM CHLORIDE SCH MLS/HR: 900 INJECTION, SOLUTION INTRAVENOUS at 08:23

## 2020-04-19 NOTE — NUR
0620--Restraints removed, pt awake, following commands, RT and this RN at bedside

0625--Pt extubated to Vapotherm 20L, 30%, pt tolerating well, o2 sat 95%

## 2020-04-19 NOTE — PULMONARY PROGRESS NOTE
Subjective


Time Seen by a Provider:  04:50


Subjective/Events-last exam


Sedated on vent.





Sepsis Event


Evaluation


Height, Weight, BMI


Height: '"


Weight: lbs. oz. kg; 27.00 BMI


Method:





Focused Exam


Lactate Level


4/17/20 00:50: Lactic Acid Level 0.79





Exam


Exam





Vital Signs








  Date Time  Temp Pulse Resp B/P (MAP) Pulse Ox O2 Delivery O2 Flow Rate FiO2


 


4/19/20 04:00      Mechanical Ventilator  21


 


4/19/20 03:24 35.8       


 


4/19/20 03:00  57 26 129/70 (89) 92 Mechanical Ventilator 21.00 


 


4/19/20 02:51  82 30  92   21


 


4/19/20 02:00  89 33 138/71 (93) 94 Mechanical Ventilator 21.00 


 


4/19/20 01:00  85 29 141/67 (91) 94 Mechanical Ventilator 21.00 


 


4/19/20 01:00  83      


 


4/19/20 00:00      Mechanical Ventilator  21


 


4/19/20 00:00  89 34 139/70 (93) 94 Mechanical Ventilator 21.00 


 


4/18/20 23:33 36.7       


 


4/18/20 23:00  89 30 133/63 (86) 92 Mechanical Ventilator 21.00 


 


4/18/20 22:49      Mechanical Ventilator 21.00 


 


4/18/20 22:44  76 27  97   21


 


4/18/20 22:00  80 32 146/70 (95) 97 Mechanical Ventilator 28.00 


 


4/18/20 21:00  76 32 143/74 (97) 97 Mechanical Ventilator 28.00 


 


4/18/20 20:16      Mechanical Ventilator 28.00 


 


4/18/20 20:00 36.7       


 


4/18/20 20:00  82 31 146/76 (99) 100 Mechanical Ventilator 30.00 


 


4/18/20 20:00      Mechanical Ventilator  30


 


4/18/20 19:00  80 30 141/74 (96) 99 Mechanical Ventilator 30.00 


 


4/18/20 19:00      Mechanical Ventilator 30.00 


 


4/18/20 19:00  80      


 


4/18/20 18:45  80 24  97   30


 


4/18/20 18:18  69      


 


4/18/20 18:00  86 31 136/73 (94) 99 Mechanical Ventilator 40.00 


 


4/18/20 17:00  84 34 135/70 (91) 98 Mechanical Ventilator 40.00 


 


4/18/20 16:00      Mechanical Ventilator  40


 


4/18/20 16:00  86 23 133/68 (89) 97 Mechanical Ventilator 40.00 


 


4/18/20 16:00 36.5       


 


4/18/20 15:15    133/68    


 


4/18/20 15:00  84 24 131/69 (89) 100 Mechanical Ventilator 40.00 


 


4/18/20 14:33  91 21  94   40


 


4/18/20 14:00  68 24 137/70 (92) 98 Mechanical Ventilator 40.00 


 


4/18/20 13:00  81 32 134/68 (90) 98 Mechanical Ventilator 40.00 


 


4/18/20 12:15  88      


 


4/18/20 12:00 37.0       


 


4/18/20 12:00      Mechanical Ventilator  40


 


4/18/20 12:00  86 31 119/63 (81) 96 Mechanical Ventilator 40.00 


 


4/18/20 11:00  90 24 115/59 (77) 93 Mechanical Ventilator 40.00 


 


4/18/20 10:25  91 21  94   40


 


4/18/20 10:00  96 27 136/76 (96) 92 Mechanical Ventilator 40.00 


 


4/18/20 09:52  98  122/69    


 


4/18/20 09:04  94 35  91   40


 


4/18/20 09:00  94 35 119/57 (77) 92 Mechanical Ventilator 40.00 


 


4/18/20 08:00 37.2       


 


4/18/20 08:00      Mechanical Ventilator  40


 


4/18/20 08:00  113 43 138/71 (93) 94 Mechanical Ventilator 40.00 


 


4/18/20 07:38  97 28  95   40


 


4/18/20 07:00  94 22 106/55 (72) 92 Mechanical Ventilator 40.00 


 


4/18/20 07:00  104      


 


4/18/20 06:00  97 23 124/62 (82) 92 Mechanical Ventilator 40.00 


 


4/18/20 05:00  96 23 113/57 (75) 92 Mechanical Ventilator 40.00 














I & O 


 


 4/19/20





 07:00


 


Intake Total 752.5 ml


 


Output Total 900 ml


 


Balance -147.5 ml








Height & Weight


Height: '"


Weight: lbs. oz. kg; 27.00 BMI


Method:


General Appearance:  No Apparent Distress, WD/WN, Other (Intubated and sedated)


HEENT:  Other (Endotracheal tube in place, OG tube in place)


Neck:  Normal Inspection, Supple


Respiratory:  No Respiratory Distress, Crackles, Rhonci


Cardiovascular:  Regular Rate, Rhythm, No Edema


Capillary Refill:  Less Than 3 Seconds


Gastrointestinal:  normal bowel sounds, non tender, soft, other (Ventral hernia)


Extremity:  Normal Inspection, Non Tender, No Pedal Edema


Neurologic/Psychiatric:  Other (sedated)


Skin:  Normal Color, Warm/Dry





Results


Lab


Laboratory Tests


4/17/20 08:15








4/18/20 02:20








4/18/20 19:55








4/19/20 03:00











Assessment/Plan


Assessment/Plan


Acute respiratory failure with pneumonia with Severe sepsis - question of 

aspiration 


   -Change Abx to Vanco, Zosyn and azithromycin d/c cefepime 


   -Continue vent 


      -Stop sedation and extubate once pt is awake. He is currently on Fi02 of 

21%. 


      -Will extubate to high flow NC at 30% and 20liters. 


   -duoneb and solumedrol 


   -Pan cultures pending 


   -influenza is negative 


   -Check RVP 


MRSA is negative - D.c Vanco 


   -COVID is neg


Acute renal failure 


   -IVF 


   -monitor 


Hypotension


   - Levophed - is off 


   -Give another liter bolus of LR 


Dehydration 


   -IVF 


Anemia 


   -Monitor











LENORA SLADE DO              Apr 19, 2020 04:53

## 2020-04-19 NOTE — PROGRESS NOTE
Subjective


Subjective


Date Seen by Provider:  2020


Time Seen by Provider:  12:24


86 yo M


-  extubated this AM


-he is asking for something to drink now because his throat is dry.


afebrile





Review of Systems


General:  No Chills, No Night Sweats


HEENT:  No Head Aches, No Visual Changes


Pulmonary:  No Dyspnea; Cough


Cardiovascular:  No: Chest Pain, Palpitations


Gastrointestinal:  No: Nausea, Vomiting


Genitourinary:  No Dysuria, No Frequency


Musculoskeletal:  No: neck pain


Neurological:  Weakness





All Other Systems Reviewed


All Other Systems Reviewed:  Yes





Objective


Exam


Vital Signs





Vital Signs








  Date Time  Temp Pulse Resp B/P (MAP) Pulse Ox O2 Delivery O2 Flow Rate FiO2


 


20 12:20  103      


 


20 12:00 36.9       


 


20 12:00  101 35 122/65 (84) 94 Vapotherm 20.00 





       30.00 


 


20 11:00  96 30 131/55 (80) 96 Vapotherm 20.00 





       30.00 


 


20 10:00  89 32 129/63 (85) 96 Vapotherm 20.00 





       30.00 


 


20 09:39     96 Vapotherm 20.00 30


 


20 09:00  92 32 124/66 (85) 93 Vapotherm 20.00 





       30.00 


 


20 08:00  89 27 125/56 (79) 94 Vapotherm 20.00 





       30.00 


 


20 08:00 36.5       


 


20 08:00      Vapotherm  30


 


20 07:00  78 36 119/54 (75) 92 Vapotherm 20.00 





       30.00 


 


20 07:00  82      


 


20 06:33     93 Vapotherm 20.00 30


 


20 06:25      Vapotherm 20.00 





       30.00 


 


20 06:00  85 30 141/67 (91) 100 Mechanical Ventilator 21.00 


 


20 05:00  82 35 142/69 (93) 92 Mechanical Ventilator 21.00 


 


20 04:00  83 30 147/71 (96) 94 Mechanical Ventilator 21.00 


 


20 04:00      Mechanical Ventilator  21


 


20 03:24 35.8       


 


20 03:00  57 26 129/70 (89) 92 Mechanical Ventilator 21.00 


 


20 02:51  82 30  92   21


 


20 02:00  89 33 138/71 (93) 94 Mechanical Ventilator 21.00 


 


20 01:00  85 29 141/67 (91) 94 Mechanical Ventilator 21.00 


 


20 01:00  83      


 


20 00:00      Mechanical Ventilator  21


 


20 00:00  89 34 139/70 (93) 94 Mechanical Ventilator 21.00 


 


20 23:33 36.7       


 


20 23:00  89 30 133/63 (86) 92 Mechanical Ventilator 21.00 


 


20 22:49      Mechanical Ventilator 21.00 


 


20 22:44  76 27  97   21


 


20 22:00  80 32 146/70 (95) 97 Mechanical Ventilator 28.00 


 


20 21:00  76 32 143/74 (97) 97 Mechanical Ventilator 28.00 


 


20 20:16      Mechanical Ventilator 28.00 


 


20 20:00 36.7       


 


20 20:00  82 31 146/76 (99) 100 Mechanical Ventilator 30.00 


 


20 20:00      Mechanical Ventilator  30


 


20 19:00  80 30 141/74 (96) 99 Mechanical Ventilator 30.00 


 


20 19:00      Mechanical Ventilator 30.00 


 


20 19:00  80      


 


20 18:45  80 24  97   30


 


20 18:18  69      


 


20 18:00  86 31 136/73 (94) 99 Mechanical Ventilator 40.00 


 


20 17:00  84 34 135/70 (91) 98 Mechanical Ventilator 40.00 


 


20 16:00      Mechanical Ventilator  40


 


20 16:00  86 23 133/68 (89) 97 Mechanical Ventilator 40.00 


 


20 16:00 36.5       


 


1820 15:15    133/68    


 


20 15:00  84 24 131/69 (89) 100 Mechanical Ventilator 40.00 


 


20 14:33  91 21  94   40


 


20 14:00  68 24 137/70 (92) 98 Mechanical Ventilator 40.00 


 


20 13:00  81 32 134/68 (90) 98 Mechanical Ventilator 40.00 














I & O 


 


 20





 07:00


 


Intake Total 1502.5 ml


 


Output Total 1100 ml


 


Balance 402.5 ml








General Appearance:  No Apparent Distress, WD/WN, Other (Intubated and sedated)


Eyes:  Bilateral Eye PERRL, Bilateral Eye Other (Bilateral mattering mild)


HEENT:  Other (Endotracheal tube in place, OG tube in place)


Neck:  Normal Inspection, Supple


Respiratory:  Chest Non Tender, Crackles, Rhonci, Other (accessory muscle use- a

little seesaw chest/abdomen breathing)


Cardiovascular:  Regular Rate, Rhythm, No Edema


Gastrointestinal:  Normal Bowel Sounds, Soft


Rectal:  Deferred


Extremity:  Normal Inspection, Non Tender, No Pedal Edema


Neurologic/Psychiatric:  Alert, Oriented x3, Other (sedated)


Skin:  Normal Color, Warm/Dry





Results


Lab


Laboratory Tests


20 17:47: Glucometer 126H


20 19:55: 


Hemoglobin 9.6L, Hematocrit 30L


20 22:53: Glucometer 172H


20 03:00: 


Hemoglobin 9.3L, Hematocrit 30L, White Blood Count 10.5, Red Blood Count 3.28L, 

Mean Corpuscular Volume 90, Mean Corpuscular Hemoglobin 28, Mean Corpuscular 

Hemoglobin Concent 31L, Red Cell Distribution Width 15.0H, Platelet Count 389, 

Mean Platelet Volume 9.7, Neutrophils (%) (Auto) 90H, Lymphocytes (%) (Auto) 7L,

Monocytes (%) (Auto) 3, Eosinophils (%) (Auto) 0, Basophils (%) (Auto) 0, 

Neutrophils # (Auto) 9.4H, Lymphocytes # (Auto) 0.7L, Monocytes # (Auto) 0.3, 

Eosinophils # (Auto) 0.0, Basophils # (Auto) 0.0, Blood Gas Puncture Site RIGHT 

RADIAL, Blood Gas Patient Temperature 35.8, Arterial Blood pH 7.40, Arterial 

Blood Partial Pressure CO2 31L, Arterial Blood Partial Pressure O2 59L, Arterial

Blood HCO3 19L, Arterial Blood Total CO2 20.2L, Arterial Blood Oxygen Saturation

91L, Arterial Blood Base Excess -4.9L, Hector Test YES-POS, Blood Gas Ventilator 

Setting YES, Blood Gas Inspired Oxygen 30%, Sodium Level 145, Potassium Level 

4.4, Chloride Level 113H, Carbon Dioxide Level 18L, Anion Gap 14, Blood Urea 

Nitrogen 37H, Creatinine 1.08, Estimat Glomerular Filtration Rate > 60, 

BUN/Creatinine Ratio 34, Glucose Level 171H, Calcium Level 8.2L, Phosphorus 

Level 4.6, Magnesium Level 2.0


20 11:03: Glucometer 168H





Microbiology


20 Gram Stain - Final, Resulted


          


20 Sputum Culture - Preliminary, Resulted


          


20 Urine Culture - Final, Complete


          NO GROWTH


20 Blood Culture - Preliminary, Resulted


          Gram Negative Coccobacillus





Assessment/Plan


Assessment/Plan


Assessment and Plan


20   weaning trial started but rr increased and oxygen saturation declined 

he was placed back on AC and sedated


OG tube is bringing up bloody secretions    -lovenox held.   Pt is on PPI BID IV

        monitoring Hgb.


Continue Vanc/zosyn/azithromycin-    1 blood culture is going gram neg-  sent 

off to [facility]


blood pressure is better-  pressors are off.


-steroids started for his COPD and respiratory failure- but steroids will not be

good for his GI bleed.    


Dr. Peña consulted.





20-  extubated.   No more blood per OG tube


-blood cultures growing gram neg hemophilus species beta lactamase +   he is on 

zosyn which appears to be working- for the pneumonia and bacteremia.    If we 

need better coverage meropenem would be what we switch to.


-bedside swallow test





Dispo:  improved.


Problems:  


(1) Pneumonia


(2) Acute on chronic respiratory failure with hypoxia and hypercapnia


(3) Acute kidney injury


Assessment & Plan:  improved





(4) COPD with acute lower respiratory infection


(5) GI bleed


Assessment & Plan:  trending hgb-   does not appear to be a brisk bleed- could 

be gastritis along with irritation from placement of OG.





(6) Bacteremia due to Gram-negative bacteria





Clinical Quality Measures


DVT/VTE Risk/Contraindication:


Risk Factor Score Per Nursin


RFS Level Per Nursing on Admit:  4+=Very High











ISAI RAGSDALE MD              2020 12:29

## 2020-04-19 NOTE — DIAGNOSTIC IMAGING REPORT
INDICATION: Pneumonia.



Time of exam 3:18 AM



Correlation is made with prior chest from one day earlier.



Support lines and catheters remain in place. Patchy bilateral

pulmonary infiltrates persist and show no real change. There

appear to be small effusions bilaterally. No pneumothorax.



IMPRESSION: Stable bilateral infiltrates and small effusions when

compared to examination one day earlier.



Dictated by: 



  Dictated on workstation # [id number]

## 2020-04-20 VITALS — DIASTOLIC BLOOD PRESSURE: 76 MMHG | SYSTOLIC BLOOD PRESSURE: 144 MMHG

## 2020-04-20 VITALS — DIASTOLIC BLOOD PRESSURE: 69 MMHG | SYSTOLIC BLOOD PRESSURE: 138 MMHG

## 2020-04-20 VITALS — SYSTOLIC BLOOD PRESSURE: 137 MMHG | DIASTOLIC BLOOD PRESSURE: 68 MMHG

## 2020-04-20 VITALS — DIASTOLIC BLOOD PRESSURE: 65 MMHG | SYSTOLIC BLOOD PRESSURE: 171 MMHG

## 2020-04-20 VITALS — SYSTOLIC BLOOD PRESSURE: 138 MMHG | DIASTOLIC BLOOD PRESSURE: 65 MMHG

## 2020-04-20 VITALS — SYSTOLIC BLOOD PRESSURE: 147 MMHG | DIASTOLIC BLOOD PRESSURE: 69 MMHG

## 2020-04-20 VITALS — SYSTOLIC BLOOD PRESSURE: 156 MMHG | DIASTOLIC BLOOD PRESSURE: 58 MMHG

## 2020-04-20 VITALS — SYSTOLIC BLOOD PRESSURE: 137 MMHG | DIASTOLIC BLOOD PRESSURE: 71 MMHG

## 2020-04-20 VITALS — DIASTOLIC BLOOD PRESSURE: 63 MMHG | SYSTOLIC BLOOD PRESSURE: 136 MMHG

## 2020-04-20 VITALS — DIASTOLIC BLOOD PRESSURE: 70 MMHG | SYSTOLIC BLOOD PRESSURE: 127 MMHG

## 2020-04-20 VITALS — DIASTOLIC BLOOD PRESSURE: 70 MMHG | SYSTOLIC BLOOD PRESSURE: 130 MMHG

## 2020-04-20 VITALS — DIASTOLIC BLOOD PRESSURE: 77 MMHG | SYSTOLIC BLOOD PRESSURE: 156 MMHG

## 2020-04-20 VITALS — DIASTOLIC BLOOD PRESSURE: 62 MMHG | SYSTOLIC BLOOD PRESSURE: 136 MMHG

## 2020-04-20 VITALS — SYSTOLIC BLOOD PRESSURE: 140 MMHG | DIASTOLIC BLOOD PRESSURE: 70 MMHG

## 2020-04-20 VITALS — SYSTOLIC BLOOD PRESSURE: 135 MMHG | DIASTOLIC BLOOD PRESSURE: 67 MMHG

## 2020-04-20 VITALS — DIASTOLIC BLOOD PRESSURE: 100 MMHG | SYSTOLIC BLOOD PRESSURE: 130 MMHG

## 2020-04-20 VITALS — DIASTOLIC BLOOD PRESSURE: 65 MMHG | SYSTOLIC BLOOD PRESSURE: 144 MMHG

## 2020-04-20 VITALS — DIASTOLIC BLOOD PRESSURE: 72 MMHG | SYSTOLIC BLOOD PRESSURE: 141 MMHG

## 2020-04-20 VITALS — DIASTOLIC BLOOD PRESSURE: 87 MMHG | SYSTOLIC BLOOD PRESSURE: 111 MMHG

## 2020-04-20 LAB
ARTERIAL PATENCY WRIST A: (no result)
BASE EXCESS STD BLDA CALC-SCNC: 0.8 MMOL/L (ref -2.5–2.5)
BASOPHILS # BLD AUTO: 0 10^3/UL (ref 0–0.1)
BASOPHILS NFR BLD AUTO: 0 % (ref 0–10)
BDY SITE: (no result)
BODY TEMPERATURE: 37.6
BUN/CREAT SERPL: 40
CALCIUM SERPL-MCNC: 8.1 MG/DL (ref 8.5–10.1)
CHLORIDE SERPL-SCNC: 114 MMOL/L (ref 98–107)
CO2 BLDA CALC-SCNC: 26 MMOL/L (ref 21–31)
CO2 SERPL-SCNC: 22 MMOL/L (ref 21–32)
CREAT SERPL-MCNC: 1.08 MG/DL (ref 0.6–1.3)
EOSINOPHIL # BLD AUTO: 0 10^3/UL (ref 0–0.3)
EOSINOPHIL NFR BLD AUTO: 0 % (ref 0–10)
ERYTHROCYTE [DISTWIDTH] IN BLOOD BY AUTOMATED COUNT: 15 % (ref 10–14.5)
GFR SERPLBLD BASED ON 1.73 SQ M-ARVRAT: > 60 ML/MIN
GLUCOSE SERPL-MCNC: 150 MG/DL (ref 70–105)
HCT VFR BLD CALC: 29 % (ref 40–54)
HGB BLD-MCNC: 9.3 G/DL (ref 13.3–17.7)
INHALED O2 FLOW RATE: (no result) L/MIN
LYMPHOCYTES # BLD AUTO: 0.8 X 10^3 (ref 1–4)
LYMPHOCYTES NFR BLD AUTO: 4 % (ref 12–44)
MAGNESIUM SERPL-MCNC: 2.3 MG/DL (ref 1.6–2.4)
MANUAL DIFFERENTIAL PERFORMED BLD QL: YES
MCH RBC QN AUTO: 29 PG (ref 25–34)
MCHC RBC AUTO-ENTMCNC: 32 G/DL (ref 32–36)
MCV RBC AUTO: 89 FL (ref 80–99)
MONOCYTES # BLD AUTO: 1.2 X 10^3 (ref 0–1)
MONOCYTES NFR BLD AUTO: 5 % (ref 0–12)
MONOCYTES NFR BLD: 6 %
NEUTROPHILS # BLD AUTO: 19.6 X 10^3 (ref 1.8–7.8)
NEUTROPHILS NFR BLD AUTO: 91 % (ref 42–75)
NEUTS BAND NFR BLD MANUAL: 86 %
NEUTS BAND NFR BLD: 7 %
PCO2 BLDA: 40 MMHG (ref 35–45)
PH BLDA: 7.41 [PH] (ref 7.37–7.43)
PHOSPHATE SERPL-MCNC: 3.6 MG/DL (ref 2.3–4.7)
PLATELET # BLD: 394 10^3/UL (ref 130–400)
PMV BLD AUTO: 9.8 FL (ref 7.4–10.4)
PO2 BLDA: 90 MMHG (ref 79–93)
POTASSIUM SERPL-SCNC: 3.3 MMOL/L (ref 3.6–5)
RBC MORPH BLD: NORMAL
SAO2 % BLDA FROM PO2: 96 % (ref 94–100)
SODIUM SERPL-SCNC: 149 MMOL/L (ref 135–145)
VARIANT LYMPHS NFR BLD MANUAL: 1 %
VENTILATION MODE VENT: YES
WBC # BLD AUTO: 21.6 10^3/UL (ref 4.3–11)

## 2020-04-20 RX ADMIN — IPRATROPIUM BROMIDE AND ALBUTEROL SULFATE SCH ML: .5; 3 SOLUTION RESPIRATORY (INHALATION) at 07:02

## 2020-04-20 RX ADMIN — METHYLPREDNISOLONE SODIUM SUCCINATE SCH MG: 40 INJECTION, POWDER, FOR SOLUTION INTRAMUSCULAR; INTRAVENOUS at 02:24

## 2020-04-20 RX ADMIN — IPRATROPIUM BROMIDE AND ALBUTEROL SULFATE SCH ML: .5; 3 SOLUTION RESPIRATORY (INHALATION) at 09:47

## 2020-04-20 RX ADMIN — METHYLPREDNISOLONE SODIUM SUCCINATE SCH MG: 40 INJECTION, POWDER, FOR SOLUTION INTRAMUSCULAR; INTRAVENOUS at 07:32

## 2020-04-20 RX ADMIN — BUDESONIDE SCH MG: 0.5 SUSPENSION RESPIRATORY (INHALATION) at 07:02

## 2020-04-20 RX ADMIN — BUDESONIDE SCH MG: 0.5 SUSPENSION RESPIRATORY (INHALATION) at 17:55

## 2020-04-20 RX ADMIN — ENOXAPARIN SODIUM SCH MG: 100 INJECTION SUBCUTANEOUS at 07:31

## 2020-04-20 RX ADMIN — SODIUM CHLORIDE SCH MLS/HR: 900 INJECTION, SOLUTION INTRAVENOUS at 05:27

## 2020-04-20 RX ADMIN — MICONAZOLE NITRATE SCH APPLIC: 20 POWDER TOPICAL at 09:03

## 2020-04-20 RX ADMIN — PANTOPRAZOLE SODIUM SCH MG: 40 INJECTION, POWDER, FOR SOLUTION INTRAVENOUS at 08:31

## 2020-04-20 RX ADMIN — INSULIN ASPART SCH UNIT: 100 INJECTION, SOLUTION INTRAVENOUS; SUBCUTANEOUS at 00:41

## 2020-04-20 RX ADMIN — IPRATROPIUM BROMIDE AND ALBUTEROL SULFATE SCH ML: .5; 3 SOLUTION RESPIRATORY (INHALATION) at 21:15

## 2020-04-20 RX ADMIN — MAGNESIUM SULFATE IN DEXTROSE SCH MLS/HR: 10 INJECTION, SOLUTION INTRAVENOUS at 04:09

## 2020-04-20 RX ADMIN — INSULIN ASPART SCH UNIT: 100 INJECTION, SOLUTION INTRAVENOUS; SUBCUTANEOUS at 06:00

## 2020-04-20 RX ADMIN — PANTOPRAZOLE SODIUM SCH MG: 40 INJECTION, POWDER, FOR SOLUTION INTRAVENOUS at 21:31

## 2020-04-20 RX ADMIN — METHYLPREDNISOLONE SODIUM SUCCINATE SCH MG: 40 INJECTION, POWDER, FOR SOLUTION INTRAMUSCULAR; INTRAVENOUS at 21:31

## 2020-04-20 RX ADMIN — IPRATROPIUM BROMIDE AND ALBUTEROL SULFATE SCH ML: .5; 3 SOLUTION RESPIRATORY (INHALATION) at 02:42

## 2020-04-20 RX ADMIN — IPRATROPIUM BROMIDE AND ALBUTEROL SULFATE SCH ML: .5; 3 SOLUTION RESPIRATORY (INHALATION) at 14:31

## 2020-04-20 RX ADMIN — SODIUM CHLORIDE SCH MLS/HR: 4.5 INJECTION, SOLUTION INTRAVENOUS at 08:43

## 2020-04-20 RX ADMIN — SODIUM CHLORIDE SCH MLS/HR: 4.5 INJECTION, SOLUTION INTRAVENOUS at 18:20

## 2020-04-20 RX ADMIN — POTASSIUM CHLORIDE SCH MEQ: 1500 TABLET, EXTENDED RELEASE ORAL at 04:10

## 2020-04-20 RX ADMIN — IPRATROPIUM BROMIDE AND ALBUTEROL SULFATE SCH ML: .5; 3 SOLUTION RESPIRATORY (INHALATION) at 17:55

## 2020-04-20 RX ADMIN — INSULIN ASPART SCH UNIT: 100 INJECTION, SOLUTION INTRAVENOUS; SUBCUTANEOUS at 12:59

## 2020-04-20 RX ADMIN — POTASSIUM CHLORIDE SCH MLS/HR: 200 INJECTION, SOLUTION INTRAVENOUS at 04:09

## 2020-04-20 RX ADMIN — METHYLPREDNISOLONE SODIUM SUCCINATE SCH MG: 40 INJECTION, POWDER, FOR SOLUTION INTRAMUSCULAR; INTRAVENOUS at 08:32

## 2020-04-20 RX ADMIN — MICONAZOLE NITRATE SCH APPLIC: 20 POWDER TOPICAL at 21:31

## 2020-04-20 RX ADMIN — SODIUM CHLORIDE SCH MLS/HR: 900 INJECTION, SOLUTION INTRAVENOUS at 08:32

## 2020-04-20 NOTE — PROGRESS NOTE
Subjective


Subjective


Date Seen by Provider:  2020


Time Seen by Provider:  08:30


86 yo M


-  Pt talking this AM.  He says he feels better-  he would like to go home.


He required BiPAP last night but was changed back to vapotherm this AM.





Review of Systems


General:  No Chills, No Night Sweats


HEENT:  No Head Aches, No Visual Changes


Pulmonary:  No Dyspnea; Cough


Cardiovascular:  No: Chest Pain, Palpitations


Gastrointestinal:  No: Nausea, Vomiting


Genitourinary:  No Dysuria, No Frequency


Musculoskeletal:  No: neck pain


Neurological:  Weakness





All Other Systems Reviewed


All Other Systems Reviewed:  Yes





Objective


Exam


Vital Signs





Vital Signs - First Documented








 20





 00:45 02:15


 


Temp 38.4 


 


Pulse 96 


 


Resp 26 


 


B/P (MAP) 111/67 (82) 


 


Pulse Ox 95 


 


O2 Delivery Nasal Cannula 


 


O2 Flow Rate 3.00 


 


FiO2  60





Capillary Refill : Less Than 3 Seconds


General Appearance:  No Apparent Distress, WD/WN, Other (Intubated and sedated)


Eyes:  Bilateral Eye PERRL, Bilateral Eye Other (Bilateral mattering mild)


HEENT:  Other (Endotracheal tube in place, OG tube in place)


Neck:  Normal Inspection, Supple


Respiratory:  Chest Non Tender, Crackles, Rhonci, Other (accessory muscle use- )


Cardiovascular:  Regular Rate, Rhythm, No Edema


Gastrointestinal:  Normal Bowel Sounds, Soft


Rectal:  Deferred


Extremity:  Normal Inspection, Non Tender, No Pedal Edema


Neurologic/Psychiatric:  Alert, Oriented x3, Other (sedated)


Skin:  Normal Color, Warm/Dry





Results


Lab


Laboratory Tests


20 17:50: Glucometer 139H


20 00:40: Glucometer 142H


20 02:55: 


White Blood Count 21.6H, Red Blood Count 3.23L, Hemoglobin 9.3L, Hematocrit 29L,

Mean Corpuscular Volume 89, Mean Corpuscular Hemoglobin 29, Mean Corpuscular 

Hemoglobin Concent 32, Red Cell Distribution Width 15.0H, Platelet Count 394, 

Mean Platelet Volume 9.8, Neutrophils (%) (Auto) 91H, Lymphocytes (%) (Auto) 4L,

Monocytes (%) (Auto) 5, Eosinophils (%) (Auto) 0, Basophils (%) (Auto) 0, 

Neutrophils # (Auto) 19.6H, Lymphocytes # (Auto) 0.8L, Monocytes # (Auto) 1.2H, 

Eosinophils # (Auto) 0.0, Basophils # (Auto) 0.0, Neutrophils % (Manual) 86, 

Lymphocytes % (Manual) 1, Monocytes % (Manual) 6, Band Neutrophils 7, Blood 

Morphology Comment NORMAL, Blood Gas Puncture Site LEFT RADIAL, Blood Gas 

Patient Temperature 37.6, Arterial Blood pH 7.41, Arterial Blood Partial 

Pressure CO2 40, Arterial Blood Partial Pressure O2 90, Arterial Blood HCO3 25, 

Arterial Blood Total CO2 26.0, Arterial Blood Oxygen Saturation 96, Arterial 

Blood Base Excess 0.8, Hector Test YES-POS, Blood Gas Ventilator Setting YES, 

Blood Gas Inspired Oxygen 30%, Sodium Level 149H, Potassium Level 3.3L, Chloride

Level 114H, Carbon Dioxide Level 22, Anion Gap 13, Blood Urea Nitrogen 43H, 

Creatinine 1.08, Estimat Glomerular Filtration Rate > 60, BUN/Creatinine Ratio 

40, Glucose Level 150H, Calcium Level 8.1L, Phosphorus Level 3.6, Magnesium 

Level 2.3


20 11:47: Glucometer 126H





Microbiology


20 Gram Stain - Final, Complete


          


20 Sputum Culture - Final, Complete


          Usual upper respiratory joe


          Haemophilus influenza


20 Urine Culture - Final, Complete


          NO GROWTH


20 Blood Culture - Final, Complete


          Haemophilus influenza





Assessment/Plan


Assessment/Plan


Assessment and Plan


20   weaning trial started but rr increased and oxygen saturation declined 

he was placed back on AC and sedated


OG tube is bringing up bloody secretions    -lovenox held.   Pt is on PPI BID IV

        monitoring Hgb.


Continue Vanc/zosyn/azithromycin-    1 blood culture is going gram neg-  sent 

off to [facility]


blood pressure is better-  pressors are off.


-steroids started for his COPD and respiratory failure- but steroids will not be

good for his GI bleed.    


Dr. Peña consulted.





20-  extubated.   No more blood per OG tube


-blood cultures growing gram neg hemophilus species beta lactamase +   he is on 

zosyn which appears to be working- for the pneumonia and bacteremia.    If we 

need better coverage meropenem would be what we switch to.


-bedside swallow test





20-  hypernatremia starting IVF  NS


changed ceftriaxone as it will treat both his H.infl pneumonia and bacteremia


-need to get him back to nasal cannula (baseline is 2-3 L oxygen NC) and then 

d/c to home.





Dispo:  improved.


Problems:  


(1) Pneumonia


Qualifiers:  


   


(2) Acute on chronic respiratory failure with hypoxia and hypercapnia


(3) Acute kidney injury


Assessment & Plan:  improved





(4) COPD with acute lower respiratory infection


(5) GI bleed


Assessment & Plan:  trending hgb-   does not appear to be a brisk bleed- could 

be gastritis along with irritation from placement of OG.





(6) Bacteremia due to Gram-negative bacteria


(7) Acute hypernatremia





Clinical Quality Measures


DVT/VTE Risk/Contraindication:


Risk Factor Score Per Nursin


RFS Level Per Nursing on Admit:  4+=Very High











ISAI RAGSDALE MD              2020 17:20

## 2020-04-20 NOTE — ST DYSPHAGIA EVALUATION
Speech Evaluation-General


Medical Diagnosis


pneumonia/respiratory failure/hypoxia/COPD


Onset Date:  Apr 17, 2020





Therapy Diagnosis


Therapy Diagnosis:  Oropharyngeal Dysphagia





Precautions


Precautions:  Aspiration





Referral


Referring Physician:  Dr. Peña





Medical History


Pertinent Medical History:  COPD (O2 dependent 2-3L), HTN, Renal Insufficiency


Reviewed History:  Yes





Social History


Home:  Nursing Home





Speech PLF/Current-Dysphagia


Prior Level of Function


Patient lives in a local nursing home where his needs were met. His diet level 

at the NH is unknown.





Subjective


Patient was sitting up in his chair. He stated he was really thirsty. He was 

cooperative with the Bedside Dysphagia Evaluation. Patient is noted to be [place].





Cognitive Status


Patient Orientation:  Person, Place, Situation





Oral Motor Skills


Dentition:  Edentalous


Ability to Follow Directions:  Good


Oral Expression Ability:  No Impairment





Voice


Voice Phonatory-Based Quality:  Hoarse


Voice Pitch:  Mildly Low


Voice Loudness:  Mildly Soft/Quiet





Face


Facial Symmetry:  Symmetrical





Oral-Facial Assessment


Oral-Facial Dentition:  Normal


Labial Seal Description:  Weak


Puff Cheeks:  Reduced Strength


Lingual Protrusion:  Normal


Lingual ROM:  Normal


Lingual Strength:  Abnormal


Lingual strength is weak


Pharynx Velopharyngeal Move.:  Normal


Volitional Dry Swallow:  Yes


Voluntary Cough:  Yes


Can Clear Throat Volitionally:  Yes


Productive Cough:  Yes





Dysphagia Evaluation


Consistencies Presented:  Thin Liquid, Mechanical Soft, Pureed


Patient was not presented the regular due to being edentulous


Oral phase is within normal range of function for consistencies presented.


Pharyngeal phase is within normal range of function for consistencies presented.


Dietary Recommendations:  Mechanical Soft


Liquid Recommendations:  Thin


Swallowing Precautions:  Alternate Liquids/Solids, Double Swallow, Decreased 

Bolus 1/2 Tsp, Liquids from Straw, Small Bites and Sips, Sitting Upright 90 

Degrees, Sitting 90 Degrees 30 Post Intake


Dysphagia Evaluation Summary


Patient is an 85 year old man who resides in a local nursing home. He was 

admitted to the hospital due to SOB and respiratory failure. Patient was 

intubated upon admission. He was extubated this date with physician referral for

BDE. The patient was pleasant and compliant with the evaluation. The patient was

presented 1/2 tsp of thin liquid x2 and small sips via straw without difficulty.

The patient was also presented 1/2 tsp bite size of puree and mechanical soft 

without difficulty. No pocketing or oral residue noted. Patient was not given 

regular texture due to edentulous state. Patient is recommended for Dysphagia II

diet level with thin liquids. This information was provided to his nurse and 

written on the white board in his room.





Speech-Plan


Patient/Family Goals


Patient/Family Goals:  


Patient will return to the nursing home with diet recommendations.





Treatment Plan


Speech Therapy Treatment Plan:  Discontinue ST


Treatment Duration:  Apr 20, 2020


Frequency:  1 time per week


Estimated Hrs Per Day:  .25 hour per day


Rehab Potential:  Guarded


Barriers to Learning:  


Patient's age and medical status


Pt/Family Agrees to Plan:  Yes





Safety Risks/Education


Teaching Recipient:  Patient


Teaching Methods:  Demonstration, Discussion


Response to Teaching:  Verbalize Understanding, Return Demonstration


Education Topics Provided:  


Safety of oral intake and diet level





Time


Speech Therapy Time In:  10:00


Speech Therapy Time Out:  10:15


Total Billed Time:  15


Billed Treatment Time


Lj CALEB Altamirano            Apr 20, 2020 10:22

## 2020-04-20 NOTE — PHYSICAL THERAPY EVALUATION
PT Evaluation-General


Medical Diagnosis


Admission Date


Apr 17, 2020 at 01:30


Medical Diagnosis:  pneumonia/respiratory failure/hypoxia/COPD


Onset Date:  Apr 17, 2020





Therapy Diagnosis


Therapy Diagnosis:  generalized weakness/debility





Precautions


Precautions/Isolations:  Fall Prevention, Standard Precautions





Referral


Physician:  Mariana


Reason for Referral:  Evaluation/Treatment





Medical History


Pertinent Medical History:  COPD (O2 dependent 2-3L), HTN, Renal Insufficiency


Additional Medical History


Dens fracture (refuses to wear collar)


Current History


EMS from NH with SOA


Reviewed History:  Yes





Social History


Home:  Nursing Home





Prior


Prior Level of Function


SCALE: Activities may be completed with or without assistive devices.





6-Indepedent-patient completes the activity by him/herself with no assistance 

from a helper.


5-Set-up or Clean-up Assistance-helper sets up or cleans up; patient completes 

activity. [place] assists only prior to or  


    following the activity.


4-Supervision or Touching Assistance-helper provides verbal cues and/or 

touching/steadying and/or contact guard assistance as patient completes 

activity. Assistance may be provided   


    throughout the activity or intermittently.


3-Partial/Moderate Assistance-helper does LESS THAN HALF the effort. [place] 

lifts, holds or supports trunk or limbs, but provides less than half the effort.


2-Substantial/Maximal Assistance-helper does MORE THAN HALF the effort. [place] 

lifts or holds trunk or limbs and provides more than half the effort.


0-Wfwrwjlka-farfyh does ALL the effort. Patient does none of the effort to 

complete the activity. Or, the assistance of 2 or more helpers is required for 

the patient to complete the  


    activity.


If activity was not attempted, code reason:


7-Patient Refused.


9-Not Applicable-not attempted and the patient did not perform the activity 

before the current illness, exacerbation or injury.


10-Not Attempted due to Environmental Limitations-(lack of equipment, weather 

restraints, etc.).


88-Not Attempted due to Medical Conditions or Safety Concerns.


Bed Mobility:  2


Transfers (B,C,W/C):  2


Gait:  2


Stairs:  9


Indoor Mobility (Ambulation):  Needed Some Help


Stairs:  Not [place]


Prior Devices Use:  Manual wheelchair, Walker


per family patient is in w/c more than ambulating





PT Evaluation-Current


Subjective


Patient agrees to PT.  Agrees up to recliner.





Pain





   Numeric Pain Scale:  0-No Pain


   Location:  No Pain Reported





Objective


Patient Orientation:  Person, Situation


Attachments:  Oxygen (vapotherm), [place] Catheter, IV





ROM/Strength


ROM Lower Extremities


bilateral LE WFL


Strength Lower Extremities


2/5 grossly bilateral LE





Integumentary/Posture


Integumentary


refer to nursing notes


Bowel Incontinence:  Yes


Bladder Incontinence:  Ventura Cath


Posture


cervical flexed posture





Neuromuscular


(Tone, Coordination, Reflexes)


severely diminished due to inactivity and motivation





Sensory


Vision:  Functional


Hearing:  Impaired


Sensation Right Lower Extremit:  Impaired


Sensation Left Lower Extremity:  Impaired





Transfers


Roll Left to Right (QC):  1


Lying to Sitting/Side of Bed(Q:  1


Sit to Stand (QC):  1


Chair/Bed-to-Chair Xfer(QC):  1


dependent with all mobility and unable to assist with bed mobility and SPT bed 

to recliner/patient required assistance to maintain sitting EOB





Gait


Does the Patient Walk?:  No and Walking Goal IS indicated


Mode of Locomotion:  Both


Anticipated Mode of Locomotion:  Both





Balance


Sitting Static:  Poor


Sitting Dynamic:  Poor


Standing Static:  Poor


 Standing Dynamic:  Poor





Assessment/Needs


85 y.o. male, will benefit from skilled PT to address functional strength and 

mobility to improve current LOF to safely return to NH at maximum LOF.  Per RN 

from family, patient is not too motivated to perform physical activity.


Rehab Potential:  Guarded





PT Long Term Goals


Long Term Goals


PT Long Term Goals Time Frame:  May 9, 2020


Roll Left & Right (QC):  4


Sit to Lying (QC):  4


Lying-Sitting on Side/Bed(QC):  4


Sit to Stand (QC):  3


Chair/Bed-to-Chair Xfer(QC):  3


Toilet Transfer (QC):  3


Does the Patient Walk:  No and Walking Goal IS indicated


Walk 10 feet (QC):  3





PT Plan


Problem List


Problem List:  Activity Tolerance, Functional Strength, Safety, Balance, Gait, 

Transfer, Bed Mobility





Treatment/Plan


Treatment Plan:  Continue Plan of Care


Treatment Plan:  Bed Mobility, Education, Functional Activity Moy, Functional 

Strength, Gait, Safety, Therapeutic Exercise, Transfers


Treatment Duration:  May 9, 2020


Frequency:  6 times per week


Estimated Hrs Per Day:  .5 hour per day





Discharge Recommendations


Therapy Discharge Recommendati:  Other, See Comments (skilled nursing facility)





Time/GCodes


Time In:  851


Time Out:  907


Total Billed Treatment Time:  16


Total Billed Treatment


1 visit


EVMod 16 [place] Rice PT              Apr 20, 2020 09:24

## 2020-04-20 NOTE — NUR
CM/SS following for discharge plans. 



Plan: The patient will return to a skilled nursing facility. 



CM/SS attempted to visit with the patient; however, the patient was difficult to understand. 
The patient stated that this ss could contact his wife but the wife is not listed on the 
face sheet as an emergency contact. 



CM/SS attempted to contact pretty Juarez ([phone]) to discuss discharge planning. She did 
not answer; a voice mail was left. CM/SS was informed that family may not want him to return 
to [facility]. Will continue to follow.

## 2020-04-20 NOTE — OCCUPATIONAL THERAPY EVAL
OT Evaluation-General/PLF


Medical Diagnosis


Admission Date


2020 at 01:30


Medical Diagnosis:  pneumonia/respiratory failure/hypoxia/COPD


Onset Date:  2020





Therapy Diagnosis


Therapy Diagnosis:  Decreased ADL status





Precautions


Precautions/Isolations:  Fall Prevention, Standard Precautions


Safety Interventions:  None





Referral


Physician:  Mariana


Referral Reason:  Activity Tolerance, Self Care, Evaluation/Treatment, 

Strengthening/ROM





Medical History


Pertinent Medical History:  COPD (O2 dependent 2-3L), HTN, Renal Insufficiency


Additional Medical History


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


Current History


PNA/ sepsis


intubated , extubated 


Reviewed History:  Yes





Social History


Home:  Nursing Home





ADL-Prior Level of Function


SCALE: Activities may be completed with or without assistive devices.





6-Indepedent-patient completes the activity by him/herself with no assistance 

from a helper.


5-Set-up or Clean-up Assistance-helper sets up or cleans up; patient completes 

activity. [place] assists only prior to or  


    following the activity.


4-Supervision or Touching Assistance-helper provides verbal cues and/or 

touching/steadying and/or contact guard assistance as patient completes 

activity. Assistance may be provided   


    throughout the activity or intermittently.


3-Partial/Moderate Assistance-helper does LESS THAN HALF the effort. [place] 

lifts, holds or supports trunk or limbs, but provides less than half the effort.


2-Substantial/Maximal Assistance-helper does MORE THAN HALF the effort. [place] 

lifts or holds trunk or limbs and provides more than half the effort.


3-Dpzosmwcb-qturjj does ALL the effort. Patient does none of the effort to 

complete the activity. Or, the assistance of 2 or more helpers is required for 

the patient to complete the  


    activity.


If activity was not attempted, code reason:


7-Patient Refused.


9-Not Applicable-not attempted and the patient did not perform the activity 

before the current illness, exacerbation or injury.


10-Not Attempted due to Environmental Limitations-(lack of equipment, weather 

restraints, etc.).


88-Not Attempted due to Medical Conditions or Safety Concerns.


ADL PLOF Comments


Pt expresses he was IND with all tasks. Pt was resident of [facility]


Self Care:  Needed Some Help


Functional Cognition:  Unknown


DME/Equipment Comments


states use of walker for fx amb


Drive Self:  No





OT Current Status


Subjective


Pt seen sitting upright in recliner chair. Pt agrees to OT eval/ treat. Denies 

pain.





Mental Status/Objective


Patient Orientation:  Person


States date is , does not know where he is, states he lives at home in 

[place]


Attachments:  Oxygen (BiPAP)





Current


Glasses/Contacts:  No


Hearing Aids:  No


Dentures/Partials:  No (states "I have no teeth")


Hand Dominance:  Left


Upper Extremity ROM


Decreased BUE (due to strength) PROM WFL


Upper Extremity Coordination


Decreased bilaterally


Upper Extremity Sensation


DNT


Upper Extremity Strength


Decreased bilaterally (2-3/5 all UE joints)





ADL-Treatment


Eating (QC):  3 (Mod A- pt requires assist holding cup during drinking task)


Oral Hygiene (QC):  3 (Min A, pt requires assist with holding sponge item in 

mouth intermittently. pt requires assist for thoroughness)





Other Treatments


Pt seen in recliner chair. Pt educated on OT role, pt completes AROM/ PROM/ MMT.

Pt educated on moving every joint to exercise and gain strength, pt completes 

wrist pumps. Pt states he lives at home in [place] and states did not 

need assist with ADLs prior, per chart pt resides at [facility]. Pt 

difficult to understand, mouth full of mucous; pt agrees to brush mouth out. Pt 

given sponge and water, completes oral hygiene and drinking water with min-mod 

A. Pt educated on continuation of therapy to increase strength/ AROM. Pt agrees,

all needs met, call light in reach.





Education


OT Patient Education:  Correct positioning, Exercise program, Purpose of 

tx/functional activities, Safety issues


Teaching Recipient:  Patient


Teaching Methods:  Demonstration, Discussion


Response to Teaching:  Verbalize Understanding, Return Demonstration, 

Reinforcement Needed





OT Short Term Goals


Short Term Goals


Eatin


Oral hygiene:  5


Upper body dressin





OT Long Term Goals


Long Term Goals


Time Frame:  2020


Eating (QC):  6


Oral Hygiene (QC):  6


Toileting Hygiene (QC):  3


Shower/Bathe Self (QC):  3


Upper Body Dressing (QC):  5


Lower Body Dressing (QC):  5


On/Off Footwear (QC):  4


Additional Goals:  1-Demonstrate ADL Tasks, 2-Verbalize Understanding, 3-

ImproveStrength/[place]oy


1=Demonstrate adherence to instructed precautions during ADL tasks.


2=Patient will verbalize/demonstrate understanding of assistive 

devices/modifications for ADL.


3=Patient will improve strength/tolerance for activity to enable patient to 

perform ADL's.





OT Education/Plan


Problem List/Assessment


Assessment:  Decreased Activ Tolerance, Decreased UE Strength, Dependent 

Transfers, Impaired Cognition, Impaired Coordination, Impaired Funct Balance, 

Impaired I ADL's, Impaired Self-Care Skills





Discharge Recommendations


Plan/Recommendations:  Continue POC


Therapy Discharge Recommendati:  24 Hour Supervision, Post Acute OT





Treatment Plan/Plan of Care


Treatment,Training & Education:  Yes


Patient would benefit from OT for education, treatment and training to promote 

independence in ADL's, mobility, safety and/or upper extremity function for 

ADL's.


Plan of Care:  ADL Retraining, Functional Mobility, UE Funct Exercise/Act, W/C 

Management Training


Treatment Duration:  2020


Frequency:  5 times per week


Estimated Hrs Per Day:  .25 hour per day


Agreement:  Yes


Rehab Potential:  Guarded





Time/GCodes


Start Time:  13:20


Stop Time:  13:41


Total Time Billed (hr/min):  21


Billed Treatment Time


DEE Calhoun (21)











PETER MADSEN OTR                2020 14:31

## 2020-04-20 NOTE — PULMONARY PROGRESS NOTE
Subjective


Date Seen by a Provider:  Apr 20, 2020


Time Seen by a Provider:  06:26


Subjective/Events-last exam


Currently on BiPAP.





Sepsis Event


Evaluation


Height, Weight, BMI


Height: '"


Weight: lbs. oz. kg; 27.00 BMI


Method:





Exam


Exam





Vital Signs








  Date Time  Temp Pulse Resp B/P (MAP) Pulse Ox O2 Delivery O2 Flow Rate FiO2


 


4/20/20 06:00  93 24 138/65 (89) 99 NIV Bilevel 30.00 


 


4/20/20 05:00  80 28 136/63 (87) 96 NIV Bilevel 30.00 


 


4/20/20 04:00     96 NIV Bilevel  40


 


4/20/20 04:00  85 19 141/72 (95) 98 NIV Bilevel 30.00 


 


4/20/20 03:20 37.6       


 


4/20/20 03:00  84 28 138/69 (92) 98 NIV Bilevel 30.00 


 


4/20/20 02:39  84 32  99  30.00 


 


4/20/20 02:30      NIV Bilevel 30.00 


 


4/20/20 02:00  82 34 130/100 (110) 100 NIV Bilevel 40.00 


 


4/20/20 01:00  89      


 


4/20/20 01:00  85 30 130/70 (90) 96 NIV Bilevel 40.00 


 


4/20/20 00:00  87 26 135/67 (89) 100 NIV Bilevel 40.00 


 


4/20/20 00:00     96 NIV Bilevel  40


 


4/19/20 23:00  92 32 123/105 (111) 97 NIV Bilevel 40.00 


 


4/19/20 22:41  87 25  97  40.00 


 


4/19/20 22:00  86 30 128/63 (84) 98 NIV Bilevel 40.00 


 


4/19/20 21:00  95 29 126/63 (84) 98 NIV Bilevel 40.00 


 


4/19/20 20:50 37.3       


 


4/19/20 20:00  100 38 111/61 (78) 97 NIV Bilevel 40.00 


 


4/19/20 20:00     96 NIV Bilevel  40


 


4/19/20 19:20      NIV Bilevel 40.00 


 


4/19/20 19:03  101 34  96  40.00 


 


4/19/20 19:00  105      


 


4/19/20 19:00  105 31 130/64 (86) 81 Vapotherm 20.00 





       30.00 


 


4/19/20 18:00  98 35 126/64 (84) 91 Vapotherm 20.00 





       30.00 


 


4/19/20 17:00  97 35 134/69 (90) 93 Vapotherm 20.00 





       30.00 


 


4/19/20 16:00  101 25 130/76 (94) 93 Vapotherm 20.00 





       30.00 


 


4/19/20 16:00      Vapotherm  30


 


4/19/20 15:55 36.8       


 


4/19/20 15:00  101 29 124/73 (90) 94 Vapotherm 20.00 





       30.00 


 


4/19/20 14:20     93 Vapotherm 20.00 30


 


4/19/20 14:00  102 35 122/64 (83) 93 Vapotherm 20.00 





       30.00 


 


4/19/20 13:00  103 35 130/68 (88) 94 Vapotherm 20.00 





       30.00 


 


4/19/20 12:20  103      


 


4/19/20 12:00      Vapotherm  30


 


4/19/20 12:00 36.9       


 


4/19/20 12:00  101 35 122/65 (84) 94 Vapotherm 20.00 





       30.00 


 


4/19/20 11:00  96 30 131/55 (80) 96 Vapotherm 20.00 





       30.00 


 


4/19/20 10:00  89 32 129/63 (85) 96 Vapotherm 20.00 





       30.00 


 


4/19/20 09:39     96 Vapotherm 20.00 30


 


4/19/20 09:00  92 32 124/66 (85) 93 Vapotherm 20.00 





       30.00 


 


4/19/20 08:00  89 27 125/56 (79) 94 Vapotherm 20.00 





       30.00 


 


4/19/20 08:00 36.5       


 


4/19/20 08:00      Vapotherm  30


 


4/19/20 07:00  78 36 119/54 (75) 92 Vapotherm 20.00 





       30.00 


 


4/19/20 07:00  82      


 


4/19/20 06:33     93 Vapotherm 20.00 30














I & O 


 


 4/20/20





 07:00


 


Intake Total 510 ml


 


Output Total 3075 ml


 


Balance -2565 ml








Height & Weight


Height: '"


Weight: lbs. oz. kg; 27.00 BMI


Method:


General Appearance:  No Apparent Distress, WD/WN, Other (Intubated and sedated)


HEENT:  Other (Endotracheal tube in place, OG tube in place)


Neck:  Normal Inspection, Supple


Respiratory:  Chest Non Tender, Crackles, Rhonci, Other (accessory muscle use- a

little seesaw chest/abdomen breathing)


Cardiovascular:  Regular Rate, Rhythm, No Edema


Capillary Refill:  Less Than 3 Seconds


Gastrointestinal:  normal bowel sounds, non tender, soft, other (Ventral hernia)


Extremity:  Normal Inspection, Non Tender, No Pedal Edema


Neurologic/Psychiatric:  Alert, Oriented x3, Other (sedated)


Skin:  Normal Color, Warm/Dry





Results


Lab


Laboratory Tests


4/18/20 19:55








4/19/20 03:00








4/20/20 02:55











Assessment/Plan


Assessment/Plan


Acute respiratory failure with pneumonia with Severe sepsis - question of 

aspiration 


   -Change Abx to Vanco, Zosyn and azithromycin d/c cefepime 


   -extubated 4/19


   -Start PT/OT 


   -Currently on BiPAP - trial back to Vapotherm this AM 


   -duoneb and solumedrol 


   -Pan cultures pending 


   -influenza is negative 


MRSA is negative - D.c Vanco 


   -COVID is neg


Acute renal failure 


   -IVF 


   -monitor 


Dehydration 


   -IVF 


Anemia 


   -Monitor











LENORA SLADE DO              Apr 20, 2020 06:27

## 2020-04-21 VITALS — SYSTOLIC BLOOD PRESSURE: 153 MMHG | DIASTOLIC BLOOD PRESSURE: 66 MMHG

## 2020-04-21 VITALS — SYSTOLIC BLOOD PRESSURE: 164 MMHG | DIASTOLIC BLOOD PRESSURE: 84 MMHG

## 2020-04-21 VITALS — SYSTOLIC BLOOD PRESSURE: 145 MMHG | DIASTOLIC BLOOD PRESSURE: 70 MMHG

## 2020-04-21 VITALS — SYSTOLIC BLOOD PRESSURE: 178 MMHG | DIASTOLIC BLOOD PRESSURE: 77 MMHG

## 2020-04-21 VITALS — DIASTOLIC BLOOD PRESSURE: 82 MMHG | SYSTOLIC BLOOD PRESSURE: 175 MMHG

## 2020-04-21 LAB
ALBUMIN SERPL-MCNC: 2.5 GM/DL (ref 3.2–4.5)
ANISOCYTOSIS BLD QL SMEAR: SLIGHT
BASOPHILS # BLD AUTO: 0 10^3/UL (ref 0–0.1)
BASOPHILS NFR BLD AUTO: 0 % (ref 0–10)
BASOPHILS NFR BLD MANUAL: 0 %
BUN/CREAT SERPL: 42
CALCIUM SERPL-MCNC: 8.3 MG/DL (ref 8.5–10.1)
CHLORIDE SERPL-SCNC: 114 MMOL/L (ref 98–107)
CO2 SERPL-SCNC: 24 MMOL/L (ref 21–32)
CREAT SERPL-MCNC: 0.9 MG/DL (ref 0.6–1.3)
EOSINOPHIL # BLD AUTO: 0 10^3/UL (ref 0–0.3)
EOSINOPHIL NFR BLD AUTO: 0 % (ref 0–10)
EOSINOPHIL NFR BLD MANUAL: 0 %
ERYTHROCYTE [DISTWIDTH] IN BLOOD BY AUTOMATED COUNT: 15.3 % (ref 10–14.5)
GFR SERPLBLD BASED ON 1.73 SQ M-ARVRAT: > 60 ML/MIN
GLUCOSE SERPL-MCNC: 131 MG/DL (ref 70–105)
HCT VFR BLD CALC: 29 % (ref 40–54)
HGB BLD-MCNC: 9 G/DL (ref 13.3–17.7)
LYMPHOCYTES # BLD AUTO: 1 X 10^3 (ref 1–4)
LYMPHOCYTES NFR BLD AUTO: 9 % (ref 12–44)
MAGNESIUM SERPL-MCNC: 2.2 MG/DL (ref 1.6–2.4)
MCH RBC QN AUTO: 28 PG (ref 25–34)
MCHC RBC AUTO-ENTMCNC: 31 G/DL (ref 32–36)
MCV RBC AUTO: 92 FL (ref 80–99)
MONOCYTES # BLD AUTO: 1 X 10^3 (ref 0–1)
MONOCYTES NFR BLD AUTO: 8 % (ref 0–12)
MONOCYTES NFR BLD: 4 %
NEUTROPHILS # BLD AUTO: 10 X 10^3 (ref 1.8–7.8)
NEUTROPHILS NFR BLD AUTO: 83 % (ref 42–75)
NEUTS BAND NFR BLD MANUAL: 76 %
NEUTS BAND NFR BLD: 6 %
PHOSPHATE SERPL-MCNC: 2.8 MG/DL (ref 2.3–4.7)
PLATELET # BLD: 305 10^3/UL (ref 130–400)
PMV BLD AUTO: 9.6 FL (ref 7.4–10.4)
POTASSIUM SERPL-SCNC: 3.7 MMOL/L (ref 3.6–5)
SODIUM SERPL-SCNC: 147 MMOL/L (ref 135–145)
VARIANT LYMPHS NFR BLD MANUAL: 14 %
WBC # BLD AUTO: 12 10^3/UL (ref 4.3–11)

## 2020-04-21 RX ADMIN — IPRATROPIUM BROMIDE AND ALBUTEROL SULFATE SCH ML: .5; 3 SOLUTION RESPIRATORY (INHALATION) at 10:59

## 2020-04-21 RX ADMIN — IPRATROPIUM BROMIDE AND ALBUTEROL SULFATE SCH ML: .5; 3 SOLUTION RESPIRATORY (INHALATION) at 01:58

## 2020-04-21 RX ADMIN — METHYLPREDNISOLONE SODIUM SUCCINATE SCH MG: 40 INJECTION, POWDER, FOR SOLUTION INTRAMUSCULAR; INTRAVENOUS at 20:13

## 2020-04-21 RX ADMIN — MICONAZOLE NITRATE SCH APPLIC: 20 POWDER TOPICAL at 08:52

## 2020-04-21 RX ADMIN — IPRATROPIUM BROMIDE AND ALBUTEROL SULFATE SCH ML: .5; 3 SOLUTION RESPIRATORY (INHALATION) at 18:11

## 2020-04-21 RX ADMIN — ENOXAPARIN SODIUM SCH MG: 100 INJECTION SUBCUTANEOUS at 06:26

## 2020-04-21 RX ADMIN — PANTOPRAZOLE SODIUM SCH MG: 40 INJECTION, POWDER, FOR SOLUTION INTRAVENOUS at 08:52

## 2020-04-21 RX ADMIN — MICONAZOLE NITRATE SCH APPLIC: 20 POWDER TOPICAL at 20:14

## 2020-04-21 RX ADMIN — IPRATROPIUM BROMIDE AND ALBUTEROL SULFATE SCH ML: .5; 3 SOLUTION RESPIRATORY (INHALATION) at 14:51

## 2020-04-21 RX ADMIN — BUDESONIDE SCH MG: 0.5 SUSPENSION RESPIRATORY (INHALATION) at 07:44

## 2020-04-21 RX ADMIN — SODIUM CHLORIDE SCH MLS/HR: 4.5 INJECTION, SOLUTION INTRAVENOUS at 13:49

## 2020-04-21 RX ADMIN — SODIUM CHLORIDE SCH MLS/HR: 4.5 INJECTION, SOLUTION INTRAVENOUS at 23:59

## 2020-04-21 RX ADMIN — IPRATROPIUM BROMIDE AND ALBUTEROL SULFATE SCH ML: .5; 3 SOLUTION RESPIRATORY (INHALATION) at 07:44

## 2020-04-21 RX ADMIN — BUDESONIDE SCH MG: 0.5 SUSPENSION RESPIRATORY (INHALATION) at 18:11

## 2020-04-21 RX ADMIN — IPRATROPIUM BROMIDE AND ALBUTEROL SULFATE SCH ML: .5; 3 SOLUTION RESPIRATORY (INHALATION) at 21:14

## 2020-04-21 RX ADMIN — SODIUM CHLORIDE SCH MLS/HR: 900 INJECTION INTRAVENOUS at 13:48

## 2020-04-21 RX ADMIN — METHYLPREDNISOLONE SODIUM SUCCINATE SCH MG: 40 INJECTION, POWDER, FOR SOLUTION INTRAMUSCULAR; INTRAVENOUS at 08:51

## 2020-04-21 RX ADMIN — SODIUM CHLORIDE SCH MLS/HR: 4.5 INJECTION, SOLUTION INTRAVENOUS at 04:17

## 2020-04-21 RX ADMIN — PANTOPRAZOLE SODIUM SCH MG: 40 INJECTION, POWDER, FOR SOLUTION INTRAVENOUS at 20:13

## 2020-04-21 NOTE — PHYSICAL THERAPY DAILY NOTE
PT Daily Note-Current


Subjective


Pt has limited speech due to respiratory issues.  He is alert and agreeable to 

therapy.





Mental Status


Patient Orientation:  Person, Place, Time, Situation


Attachments:  Oxygen, IV





Transfers


SCALE: Activities may be completed with or without assistive devices.





6-Indepedent-patient completes the activity by him/herself with no assistance 

from a helper.


5-Set-up or Clean-up Assistance-helper sets up or cleans up; patient completes 

activity. [place] assists only prior to or  


    following the activity.


4-Supervision or Touching Assistance-helper provides verbal cues and/or 

touching/steadying and/or contact guard assistance as patient completes 

activity. Assistance may be provided   


    throughout the activity or intermittently.


3-Partial/Moderate Assistance-helper does LESS THAN HALF the effort. [place] 

lifts, holds or supports trunk or limbs, but provides less than half the effort.


2-Substantial/Maximal Assistance-helper does MORE THAN HALF the effort. [place] 

lifts or holds trunk or limbs and provides more than half the effort.


0-Ypooahtrr-epsmyc does ALL the effort. Patient does none of the effort to 

complete the activity. Or, the assistance of 2 or more helpers is required for 

the patient to complete the  


    activity.


If activity was not attempted, code reason:


7-Patient Refused.


9-Not Applicable-not attempted and the patient did not perform the activity befo

re the current illness, exacerbation or injury.


10-Not Attempted due to Environmental Limitations-(lack of equipment, weather re

straints, etc.).


88-Not Attempted due to Medical Conditions or Safety Concerns.


Roll Left & Right (QC):  1


Sit to Lying (QC):  1


Lying to Sitting/Side of Bed(Q:  1





Gait Training


Does the Patient Walk?:  No and Walking Goal IS indicated





Exercises


Supine Ex:  LE Protocol


Supine Reps:  12


moderate assist for all supine ex, with (L) LE requiring more assist than (R).





Treatments


Performed supine to sit and then sat edge of bed for 5 minutes to work on 

posture and self righting while seated.  Pt initially was not able to self right

and required max assist to remain upright.  He was eventually able to self right

and sat without assistance for 30 seconds.





Assessment


Current Status:  Fair Progress


Pt would benefit from continued treatment to improve his mobility and progress 

to transfers to allow him to sit upright longer.





PT Long Term Goals


Long Term Goals


PT Long Term Goals Time Frame:  May 9, 2020


Roll Left & Right (QC):  4


Sit to Lying (QC):  4


Lying-Sitting on Side/Bed(QC):  4


Sit to Stand (QC):  3


Chair/Bed-to-Chair Xfer(QC):  3


Toilet Transfer (QC):  3


Does the Patient Walk:  No and Walking Goal IS indicated


Walk 10 feet (QC):  3





PT Plan


Treatment/Plan


Treatment Plan:  Continue Plan of Care


Treatment Plan:  Bed Mobility, Education, Functional Activity Moy, Functional 

Strength, Gait, Safety, Therapeutic Exercise, Transfers


Treatment Duration:  May 9, 2020


Frequency:  6 times per week


Estimated Hrs Per Day:  .5 hour per day


Patient and/or Family Agrees t:  Yes





Time/GCodes


Time In:  0815


Time Out:  0840


Total Billed Treatment Time:  25


Total Billed Treatment


1, ex (15), fa (10)











TAN PERRY PT            Apr 21, 2020 08:46

## 2020-04-21 NOTE — PHYSICIAN QUERY CLARIFICATION
Physician Query-General


Query to Physician:


The medical record reflects the following clinical scenario:





History/Risk factors: Pneumonia, 





Clinical Findings: Hypotension, Tachycardia, fevers, 





Treatment:  Aggressive IV hydration, Levophed gtt, IV ABX 








Question:  What condition best reflects the above clinical scenario?


Please document response in the Progress notes or Discharge Summary.





1. Sepsis present on admission, improved now





2. Bacteremia Gram neg. (As currently Documented)





3. Other , with explanation of the clinical findings





4. Clinically undetermined, no explanation for the clinical findings








Please remember a lack of response to the above will prompt a phone page by 

CDI/coding staff





In responding to this query, please exercise your independent professional 

judgment.  The purpose of this communication is to more accurately reflect the 

complexity of your patients condition. The fact that a question is asked does 

not imply that any particular answer is desired or expected.  








Thank you for timely response to this clarification.   





Laurie Dawkins, MSN, RN


RN Specialist-Clinical Doc Improvement 


[facility] [address]


[facility]


t: [phone] | f: [phone]


If you are unable to reach me at my extension, I may be working from home. 

Please contact me at [phone]





PHYSICIAN RESPONSE:


Based on the clinical findings in the record, please respond to the query above 

on this document as an addendum. 








Physician Response:


Physician Response


1 sepsis, currently better-     H/P  also had septic shock - he is no longer in 

septic shock with ivf, pressors discontinued.  So i do not have it on my current

diagnosis list.














If you have questions please contact:


                   


:


Ext:





Thank you for your time and cooperation.


Clinical /








*********************This is a permanent part of the medical record

*******************











LAURIE DAWKINS                   Apr 21, 2020 14:36


ISAI RAGSDALE MD              Apr 22, 2020 14:14

## 2020-04-21 NOTE — NUR
CM/SS follow up. 



CM/SS spoke with the patients daughter Ann via phone to discuss discharge plans. Ann 
stated that she did not want the patient to return to [facility] due to not 
being happy with his care. She verbalized that she wanted to bring him home. She believes 
that she will be able to provide him with adequate care. CM/SS informed the daughter that 
the patient is doing poorly with physical therapy due to weakness. CM/SS also discussed 
safety. Ann stated that she will get him a commode, new bed (not hospital), and any new 
equipment he needs for home. She also stated her sister is a Nurse who will be coming to the 
house to help out and two 19-year-old grandchildren for lifting purposes.



CM/SS spoke with her about needing to get the patient stronger and more independent before 
he discharged home. CM/SS discussed with the daughter that inpatient rehab may be an option. 
CM/SS reached out to April, CL and Dr. Tomas who agreed with place. Inpatient rehab 
approved the patient for tomorrow morning. CM/SS attempted to contact the patients daughterAnn for an update. A voice mail was left. Will continue to follow.

## 2020-04-21 NOTE — NUR
RD ASSESSMENT 



PMHx: HTN; COPD; GERD; renal failure



PT INTERACTION: Pt was awake and pleasant during nutrition assessment. Pt states current 
appetite is pretty good. Note avg PO intake <25% x2meal, per chart review. Pt states 
following a regular diet at home. Pt states having no issues with chewing, but states he has 
no teeth. Pt states no recent issues with n/v/c/d at this time. Note last BM was 4/20 and pt 
not currently on bowel regimen per chart review. Pt states no recent wt changes. Note recent 
3# wt gain x1mon, per chart review. 



ABNORMAL NUTRITION-RELATED LAB VALUES

LOW: Ca 8.3; alb 2.5

HIGH: Na 147; Cl 114; BUN 38; glu 131



Est. kcal needs: 7069-9975 kcal | 20-25 kcal/kg  

Est. Pro needs:  70-87 g Pro | 0.8-1.0 g Pro/kg 



PES STATEMENT: Inadequate oral intake (NI-2.1) related to loss of appetite as evidenced by 
pt interview | avg PO intake <25% x2meal



INTERVENTION:  

Continue with current diet order of DYS2 [facility] Altered diet. 

Add Ensure Enlive (vary) to meals TID, for increased kcal intake. Provides 350 kcal and 13 g 
Pro per serving. 

Will continue to follow and reassess as pt needs, intake, and status change. 



MONITOR/EVALUATE:  

PO Intake; Plan of Care; Hydration Status; Weight Status; Lab Values 





ROLAND Nunn, MS, RD, LD

## 2020-04-21 NOTE — PROGRESS NOTE
Subjective


Subjective


Date Seen by Provider:  2020


Time Seen by Provider:  12:55


86 yo M


-  No overnight events.


-Pt reports he was agitated this AM because he wanted to get up out of bed but 

was unable to physically and he was told by staff that he could not get up on 

his own.


-Feels his breathing is improving.





Review of Systems


General:  No Chills, No Night Sweats


HEENT:  No Head Aches, No Visual Changes


Pulmonary:  No Dyspnea; Cough


Cardiovascular:  No: Chest Pain, Palpitations


Gastrointestinal:  No: Nausea, Vomiting


Genitourinary:  No Dysuria, No Frequency


Musculoskeletal:  No: neck pain


Neurological:  Weakness





All Other Systems Reviewed


All Other Systems Reviewed:  Yes





Objective


Exam


Vital Signs





Vital Signs








  Date Time  Temp Pulse Resp B/P (MAP) Pulse Ox O2 Delivery O2 Flow Rate FiO2


 


20 21:14  79 29  95  30.00 


 


20 19:54 36.5 74 22 153/66 (95) 98 High Flow N/C 7.00 


 


20 19:39      High Flow N/C 7.00 


 


20 19:25  78      


 


20 18:15     96 High Flow N/C 7.00 


 


20 18:12     96 High Flow N/C 7.00 


 


20 16:12 36.8 75 21 145/70 (95) 96 High Flow N/C 7.00 


 


20 14:51     94 High Flow N/C 7.00 


 


20 13:37  77      


 


20 12:00 36.0 72 22 175/82 (113) 98 NIV Bilevel  


 


20 10:59     98 NIV Bilevel  30


 


20 08:00      High Flow N/C 7.00 


 


20 08:00 37.1 73 18 164/84 (110) 99 NIV Bilevel  


 


20 07:44  79 28  98  40.00 


 


20 04:00 36.9 71 15 178/77 (110) 98 NIV Bilevel  


 


20 01:58  87 23  95  40.00 


 


20 01:00  69      


 


20 23:55 36.3 65 18 171/65 (100) 97 NIV Bilevel  














I & O 


 


 20





 07:00


 


Intake Total 1095 ml


 


Output Total 1275 ml


 


Balance -180 ml








General Appearance:  No Apparent Distress, WD/WN, Other (Intubated and sedated)


Eyes:  Bilateral Eye PERRL, Bilateral Eye Other (Bilateral mattering mild)


HEENT:  Other (Endotracheal tube in place, OG tube in place)


Neck:  Normal Inspection, Supple


Respiratory:  Chest Non Tender, Crackles, Rhonci, Other (accessory muscle use- )


Cardiovascular:  Regular Rate, Rhythm, No Edema


Gastrointestinal:  Normal Bowel Sounds, Soft


Rectal:  Deferred


Extremity:  Normal Inspection, Non Tender, No Pedal Edema


Neurologic/Psychiatric:  Alert, Oriented x3, Other (sedated)


Skin:  Normal Color, Warm/Dry





Results


Lab


Laboratory Tests


20 08:58: 


White Blood Count 12.0H, Red Blood Count 3.19L, Hemoglobin 9.0L, Hematocrit 29L,

Mean Corpuscular Volume 92, Mean Corpuscular Hemoglobin 28, Mean Corpuscular 

Hemoglobin Concent 31L, Red Cell Distribution Width 15.3H, Platelet Count 305, 

Mean Platelet Volume 9.6, Neutrophils (%) (Auto) 83H, Lymphocytes (%) (Auto) 9L,

Monocytes (%) (Auto) 8, Eosinophils (%) (Auto) 0, Basophils (%) (Auto) 0, 

Neutrophils # (Auto) 10.0H, Lymphocytes # (Auto) 1.0, Monocytes # (Auto) 1.0, 

Eosinophils # (Auto) 0.0, Basophils # (Auto) 0.0, Neutrophils % (Manual) 76, 

Lymphocytes % (Manual) 14, Monocytes % (Manual) 4, Eosinophils % (Manual) 0, 

Basophils % (Manual) 0, Band Neutrophils 6, Anisocytosis SLIGHT, Sodium Level 

147H, Potassium Level 3.7, Chloride Level 114H, Carbon Dioxide Level 24, Anion 

Gap 9, Blood Urea Nitrogen 38H, Creatinine 0.90, Estimat Glomerular Filtration 

Rate > 60, BUN/Creatinine Ratio 42, Glucose Level 131H, Calcium Level 8.3L, 

Phosphorus Level 2.8, Magnesium Level 2.2, Albumin 2.5L





Microbiology


20 Gram Stain - Final, Complete


          


20 Sputum Culture - Final, Complete


          Usual upper respiratory joe


          Haemophilus influenza


20 Urine Culture - Final, Complete


          NO GROWTH


20 Blood Culture - Final, Complete


          Haemophilus influenza





Assessment/Plan


Assessment/Plan


Assessment and Plan


20   weaning trial started but rr increased and oxygen saturation declined 

he was placed back on AC and sedated


OG tube is bringing up bloody secretions    -lovenox held.   Pt is on PPI BID IV

        monitoring Hgb.


Continue Vanc/zosyn/azithromycin-    1 blood culture is going gram neg-  sent 

off to [facility]


blood pressure is better-  pressors are off.


-steroids started for his COPD and respiratory failure- but steroids will not be

good for his GI bleed.    


Dr. Peña consulted.





20-  extubated.   No more blood per OG tube


-blood cultures growing gram neg hemophilus species beta lactamase +   he is on 

zosyn which appears to be working- for the pneumonia and bacteremia.    If we 

need better coverage meropenem would be what we switch to.


-bedside swallow test





20-  hypernatremia starting IVF  NS


changed ceftriaxone as it will treat both his H.infl pneumonia and bacteremia


-need to get him back to nasal cannula (baseline is 2-3 L oxygen NC) and then 

d/c to home.





20-  Na a little improved with IVF.  He is drinking water without issue and

eating.  


-Inpatient rehab to evaluate him other possible discharge plans include back to 

health and rehab; daughter also voiced she planned on taking him home.


-need to get him closer to his baseline oxygen requirement of 2-3L.


-will d/c his IVF since he taking po.


-pneumonia, bacteremia (H. influ)  covered with rocephin.





Dispo:  improved.


Problems:  


(1) Pneumonia


Qualifiers:  


   


(2) Acute on chronic respiratory failure with hypoxia and hypercapnia


(3) Acute kidney injury


Assessment & Plan:  improved





(4) COPD with acute lower respiratory infection


(5) GI bleed


Assessment & Plan:  trending hgb-   does not appear to be a brisk bleed- could 

be gastritis along with irritation from placement of OG.





(6) Bacteremia due to Gram-negative bacteria


(7) Acute hypernatremia





Clinical Quality Measures


DVT/VTE Risk/Contraindication:


Risk Factor Score Per Nursin


RFS Level Per Nursing on Admit:  4+=Very High











ISAI RAGSDALE MD              2020 23:46

## 2020-04-21 NOTE — OCCUPATIONAL THER DAILY NOTE
OT Current Status-Daily Note


Subjective


Pt seen in bed. Pt on BiPAP, difficulty understanding, pt must repeat self 

multiple times. Oriented to person. Pt agrees to therapy, stating, "I can't have

people waiting on me."





Mental Status/Objective


Patient Orientation:  Person


Attachments:  Oxygen (BiPAP)





ADL-Treatment


Therapy Code Descriptions/Definitions 





Functional [place] Measure:


0=Not Assessed/NA        4=Minimal Assistance


1=Total Assistance        5=Supervision or Setup


2=Maximal Assistance  6=Modified [place]


3=Moderate Assistance 7=Complete IndependenceSCALE: Activities may be completed 

with or without assistive devices.





6-Indepedent-patient completes the activity by him/herself with no assistance 

from a helper.


5-Set-up or Clean-up Assistance-helper sets up or cleans up; patient completes 

activity. [place] assists only prior to or  


    following the activity.


4-Supervision or Touching Assistance-helper provides verbal cues and/or 

touching/steadying and/or contact guard assistance as patient completes 

activity. Assistance may be provided   


    throughout the activity or intermittently.


3-Partial/Moderate Assistance-helper does LESS THAN HALF the effort. [place] 

lifts, holds or supports trunk or limbs, but provides less than half the effort.


2-Substantial/Maximal Assistance-helper does MORE THAN HALF the effort. [place] 

lifts or holds trunk or limbs and provides more than half the effort.


6-Cvjryiqwh-jhpjua does ALL the effort. Patient does none of the effort to 

complete the activity. Or, the assistance of 2 or more helpers is required for 

the patient to complete the  


    activity.


If activity was not attempted, code reason:


7-Patient Refused.


9-Not Applicable-not attempted and the patient did not perform the activity 

before the current illness, exacerbation or injury.


10-Not Attempted due to Environmental Limitations-(lack of equipment, weather 

restraints, etc.).


88-Not Attempted due to Medical Conditions or Safety Concerns.


Eating (QC):  3 (mod A- assist to hold water cup at mouth level.)





Other Treatment


Pt seen at 1000, agrees to therapy. States he has been up out of chair, agrees 

to ther ex in bed. Pt completes finger manipulation task, requires assist for 

opposition (full). Pt educated on pink hand sponge squeezes, completes 10 

bilaterally, able to transfer sponge hand to hand with min A for shoulder 

adduction. Pt completes bicep curls (touching shoulders) 10x, and shoulder 

flexion with assist for full range for both exercises. Pt agrees he needs assist

for feeding. Pt educated on built up handle for utensils, pt able to hold in L 

hand and bring handle to mouth with min A for end range, pt maintains  

throughout. Pt asks for drink of water, completes with max A. Mask repositioned.

All needs met, call light in reach.





Education


OT Patient Education:  Correct positioning, Exercise program, Home exercise 

program, Modified ADL techniques, Progress toward Goal/Update tx plan, Use of 

adapted equipment


Teaching Recipient:  Patient


Teaching Methods:  Demonstration, Discussion


Response to Teaching:  Verbalize Understanding, Return Demonstration, 

Reinforcement Needed





OT Short Term Goals


Short Term Goals


Eatin


Oral hygiene:  5


Upper body dressin





OT Long Term Goals


Long Term Goals


Time Frame:  2020


Eating (QC):  6


Oral Hygiene (QC):  6


Toileting Hygiene (QC):  3


Shower/Bathe Self (QC):  3


Upper Body Dressing (QC):  5


Lower Body Dressing (QC):  5


On/Off Footwear (QC):  4


Additional Goals:  1-Demonstrate ADL Tasks, 2-Verbalize Understanding, 3-

ImproveStrength/Moy


1=Demonstrate adherence to instructed precautions during ADL tasks.


2=Patient will verbalize/demonstrate understanding of assistive 

devices/modifications for ADL.


3=Patient will improve strength/tolerance for activity to enable patient to 

perform ADL's.





OT Education/Plan


Problem List/Assessment


Assessment:  Decreased Activ Tolerance, Decreased UE Strength, Dependent 

Transfers, Edema, Impaired Bed Mobility, Impaired Cognition, Impaired 

Coordination, Impaired Funct Balance, Impaired I ADL's, Impaired Self-Care S

kills





Discharge Recommendations


Plan/Recommendations:  Continue POC


Therapy Discharge Recommendati:  24 Hour Supervision





Treatment Plan/Plan of Care


Treatment,Training & Education:  Yes


Patient would benefit from OT for education, treatment and training to promote 

independence in ADL's, mobility, safety and/or upper extremity function for 

ADL's.


Plan of Care:  ADL Retraining, Functional Mobility, UE Funct Exercise/Act, W/C 

Management Training


Treatment Duration:  2020


Frequency:  5 times per week


Estimated Hrs Per Day:  .25 hour per day


Agreement:  Yes


Rehab Potential:  Guarded





Time/GCodes


Start Time:  10:00


Stop Time:  10:20


Total Time Billed (hr/min):  20


Billed Treatment Time


1, EX (20)











PETER MADSEN OTR                2020 11:04

## 2020-04-22 VITALS — DIASTOLIC BLOOD PRESSURE: 73 MMHG | SYSTOLIC BLOOD PRESSURE: 161 MMHG

## 2020-04-22 VITALS — SYSTOLIC BLOOD PRESSURE: 189 MMHG | DIASTOLIC BLOOD PRESSURE: 101 MMHG

## 2020-04-22 VITALS — DIASTOLIC BLOOD PRESSURE: 71 MMHG | SYSTOLIC BLOOD PRESSURE: 158 MMHG

## 2020-04-22 VITALS — DIASTOLIC BLOOD PRESSURE: 74 MMHG | SYSTOLIC BLOOD PRESSURE: 181 MMHG

## 2020-04-22 VITALS — SYSTOLIC BLOOD PRESSURE: 172 MMHG | DIASTOLIC BLOOD PRESSURE: 73 MMHG

## 2020-04-22 VITALS — SYSTOLIC BLOOD PRESSURE: 181 MMHG | DIASTOLIC BLOOD PRESSURE: 62 MMHG

## 2020-04-22 LAB
ALBUMIN SERPL-MCNC: 2.8 GM/DL (ref 3.2–4.5)
ALP SERPL-CCNC: 42 U/L (ref 40–136)
ALT SERPL-CCNC: 20 U/L (ref 0–55)
ARTERIAL PATENCY WRIST A: (no result)
BASE EXCESS STD BLDA CALC-SCNC: 5.8 MMOL/L (ref -2.5–2.5)
BASOPHILS # BLD AUTO: 0 10^3/UL (ref 0–0.1)
BASOPHILS # BLD AUTO: 0 10^3/UL (ref 0–0.1)
BASOPHILS NFR BLD AUTO: 0 % (ref 0–10)
BASOPHILS NFR BLD AUTO: 0 % (ref 0–10)
BDY SITE: (no result)
BILIRUB SERPL-MCNC: 0.3 MG/DL (ref 0.1–1)
BODY TEMPERATURE: 37.2
BUN/CREAT SERPL: 39
BUN/CREAT SERPL: 43
CALCIUM SERPL-MCNC: 8.5 MG/DL (ref 8.5–10.1)
CALCIUM SERPL-MCNC: 8.7 MG/DL (ref 8.5–10.1)
CHLORIDE SERPL-SCNC: 108 MMOL/L (ref 98–107)
CHLORIDE SERPL-SCNC: 113 MMOL/L (ref 98–107)
CO2 BLDA CALC-SCNC: 31.1 MMOL/L (ref 21–31)
CO2 SERPL-SCNC: 25 MMOL/L (ref 21–32)
CO2 SERPL-SCNC: 28 MMOL/L (ref 21–32)
CREAT SERPL-MCNC: 0.77 MG/DL (ref 0.6–1.3)
CREAT SERPL-MCNC: 0.83 MG/DL (ref 0.6–1.3)
EOSINOPHIL # BLD AUTO: 0 10^3/UL (ref 0–0.3)
EOSINOPHIL # BLD AUTO: 0 10^3/UL (ref 0–0.3)
EOSINOPHIL NFR BLD AUTO: 0 % (ref 0–10)
EOSINOPHIL NFR BLD AUTO: 0 % (ref 0–10)
ERYTHROCYTE [DISTWIDTH] IN BLOOD BY AUTOMATED COUNT: 15.1 % (ref 10–14.5)
ERYTHROCYTE [DISTWIDTH] IN BLOOD BY AUTOMATED COUNT: 15.2 % (ref 10–14.5)
GFR SERPLBLD BASED ON 1.73 SQ M-ARVRAT: > 60 ML/MIN
GFR SERPLBLD BASED ON 1.73 SQ M-ARVRAT: > 60 ML/MIN
GLUCOSE SERPL-MCNC: 108 MG/DL (ref 70–105)
GLUCOSE SERPL-MCNC: 119 MG/DL (ref 70–105)
HCT VFR BLD CALC: 30 % (ref 40–54)
HCT VFR BLD CALC: 32 % (ref 40–54)
HGB BLD-MCNC: 10 G/DL (ref 13.3–17.7)
HGB BLD-MCNC: 9.3 G/DL (ref 13.3–17.7)
INHALED O2 FLOW RATE: (no result) L/MIN
LYMPHOCYTES # BLD AUTO: 0.9 X 10^3 (ref 1–4)
LYMPHOCYTES # BLD AUTO: 1 X 10^3 (ref 1–4)
LYMPHOCYTES NFR BLD AUTO: 8 % (ref 12–44)
LYMPHOCYTES NFR BLD AUTO: 8 % (ref 12–44)
MAGNESIUM SERPL-MCNC: 2.3 MG/DL (ref 1.6–2.4)
MANUAL DIFFERENTIAL PERFORMED BLD QL: NO
MANUAL DIFFERENTIAL PERFORMED BLD QL: NO
MCH RBC QN AUTO: 29 PG (ref 25–34)
MCH RBC QN AUTO: 29 PG (ref 25–34)
MCHC RBC AUTO-ENTMCNC: 31 G/DL (ref 32–36)
MCHC RBC AUTO-ENTMCNC: 31 G/DL (ref 32–36)
MCV RBC AUTO: 92 FL (ref 80–99)
MCV RBC AUTO: 93 FL (ref 80–99)
MONOCYTES # BLD AUTO: 0.7 X 10^3 (ref 0–1)
MONOCYTES # BLD AUTO: 0.7 X 10^3 (ref 0–1)
MONOCYTES NFR BLD AUTO: 6 % (ref 0–12)
MONOCYTES NFR BLD AUTO: 6 % (ref 0–12)
NEUTROPHILS # BLD AUTO: 10.6 X 10^3 (ref 1.8–7.8)
NEUTROPHILS # BLD AUTO: 9.2 X 10^3 (ref 1.8–7.8)
NEUTROPHILS NFR BLD AUTO: 85 % (ref 42–75)
NEUTROPHILS NFR BLD AUTO: 86 % (ref 42–75)
PCO2 BLDA: 44 MMHG (ref 35–45)
PH BLDA: 7.45 [PH] (ref 7.37–7.43)
PHOSPHATE SERPL-MCNC: 2.5 MG/DL (ref 2.3–4.7)
PLATELET # BLD: 268 10^3/UL (ref 130–400)
PLATELET # BLD: 284 10^3/UL (ref 130–400)
PMV BLD AUTO: 10.2 FL (ref 7.4–10.4)
PMV BLD AUTO: 10.3 FL (ref 7.4–10.4)
PO2 BLDA: 63 MMHG (ref 79–93)
POTASSIUM SERPL-SCNC: 3.7 MMOL/L (ref 3.6–5)
POTASSIUM SERPL-SCNC: 4 MMOL/L (ref 3.6–5)
PROT SERPL-MCNC: 5.6 GM/DL (ref 6.4–8.2)
SAO2 % BLDA FROM PO2: 90 % (ref 94–100)
SODIUM SERPL-SCNC: 146 MMOL/L (ref 135–145)
SODIUM SERPL-SCNC: 147 MMOL/L (ref 135–145)
VENTILATION MODE VENT: YES
WBC # BLD AUTO: 10.8 10^3/UL (ref 4.3–11)
WBC # BLD AUTO: 12.3 10^3/UL (ref 4.3–11)

## 2020-04-22 RX ADMIN — METHYLPREDNISOLONE SODIUM SUCCINATE SCH MG: 40 INJECTION, POWDER, FOR SOLUTION INTRAMUSCULAR; INTRAVENOUS at 08:03

## 2020-04-22 RX ADMIN — PANTOPRAZOLE SODIUM SCH MG: 40 INJECTION, POWDER, FOR SOLUTION INTRAVENOUS at 19:55

## 2020-04-22 RX ADMIN — MICONAZOLE NITRATE SCH APPLIC: 20 POWDER TOPICAL at 20:00

## 2020-04-22 RX ADMIN — BUDESONIDE SCH MG: 0.5 SUSPENSION RESPIRATORY (INHALATION) at 06:17

## 2020-04-22 RX ADMIN — IPRATROPIUM BROMIDE AND ALBUTEROL SULFATE SCH ML: .5; 3 SOLUTION RESPIRATORY (INHALATION) at 06:17

## 2020-04-22 RX ADMIN — ENOXAPARIN SODIUM SCH MG: 100 INJECTION SUBCUTANEOUS at 06:31

## 2020-04-22 RX ADMIN — POTASSIUM CHLORIDE NR MEQ: 1500 TABLET, EXTENDED RELEASE ORAL at 11:30

## 2020-04-22 RX ADMIN — BUDESONIDE SCH MG: 0.5 SUSPENSION RESPIRATORY (INHALATION) at 18:27

## 2020-04-22 RX ADMIN — POTASSIUM CHLORIDE NR MEQ: 1500 TABLET, EXTENDED RELEASE ORAL at 11:15

## 2020-04-22 RX ADMIN — SODIUM CHLORIDE SCH MLS/HR: 900 INJECTION INTRAVENOUS at 13:05

## 2020-04-22 RX ADMIN — IPRATROPIUM BROMIDE AND ALBUTEROL SULFATE SCH ML: .5; 3 SOLUTION RESPIRATORY (INHALATION) at 22:06

## 2020-04-22 RX ADMIN — MICONAZOLE NITRATE SCH APPLIC: 20 POWDER TOPICAL at 08:04

## 2020-04-22 RX ADMIN — IPRATROPIUM BROMIDE AND ALBUTEROL SULFATE SCH ML: .5; 3 SOLUTION RESPIRATORY (INHALATION) at 10:51

## 2020-04-22 RX ADMIN — IPRATROPIUM BROMIDE AND ALBUTEROL SULFATE SCH ML: .5; 3 SOLUTION RESPIRATORY (INHALATION) at 01:52

## 2020-04-22 RX ADMIN — IPRATROPIUM BROMIDE AND ALBUTEROL SULFATE SCH ML: .5; 3 SOLUTION RESPIRATORY (INHALATION) at 14:39

## 2020-04-22 RX ADMIN — METHYLPREDNISOLONE SODIUM SUCCINATE SCH MG: 40 INJECTION, POWDER, FOR SOLUTION INTRAMUSCULAR; INTRAVENOUS at 19:56

## 2020-04-22 RX ADMIN — PANTOPRAZOLE SODIUM SCH MG: 40 INJECTION, POWDER, FOR SOLUTION INTRAVENOUS at 08:03

## 2020-04-22 RX ADMIN — LORAZEPAM PRN MG: 2 INJECTION INTRAMUSCULAR; INTRAVENOUS at 22:45

## 2020-04-22 RX ADMIN — IPRATROPIUM BROMIDE AND ALBUTEROL SULFATE SCH ML: .5; 3 SOLUTION RESPIRATORY (INHALATION) at 18:25

## 2020-04-22 NOTE — NUR
Patient to room 222 at this time. This RN saw patient and immediately saw that patient had 
labored abdominal breathing. Patient was o2 sat was 96% on 5L NC however, RT here in 
patient's room as well at this time and states "His breathing has been like this all 
morning." and that she was going to put patient back on BIPAP. 

1102-This RN gets patient liaison, April in room and April calls Dr. Barragan. April notifies 
Dr. Barragan of patient's situation. April states Dr. Barragan instructs this RN to Call Dr. Peña

-1105- This RN calls Dr. Peña at this time to notify him of patient's status. Dr. Peña 
orders: 40 iv lasix x1 now, 20mg kcl PO now, cxr. Dr. Chisholm states to order repeat labs and 
cxr for tomorrow AM. 

1110-April states that Dr. Barragan wants patient transferred back up to med/Surg floor. 

1128-This RN called Dr. Peña to let him know that this RN could not get patient to take 
the 20 mg kcl PO.  States "That is ok, go ahead and also get an ABG 30mins after patient 
placed on BIPAP and repeat labs at 1600. 

Will carry out orders and get patient transferred back up to room 416.

## 2020-04-22 NOTE — OCCUPATIONAL THER DAILY NOTE
OT Current Status-Daily Note


Subjective


Pt. does not report pain.  Pt. attempts to verbalize, but is mostly 

unintelligible.





Appearance


Pt. in bed when therapy enters room.  Pt. on 5 L 02 through nasal cannula.





Mental Status/Objective


Patient Orientation:  Unable to Assess


Attachments:  [place] Catheter, IV, Oxygen, Telemetry





ADL-Treatment


Therapy Code Descriptions/Definitions 





Functional [place] Measure:


0=Not Assessed/NA        4=Minimal Assistance


1=Total Assistance        5=Supervision or Setup


2=Maximal Assistance  6=Modified [place]


3=Moderate Assistance 7=Complete IndependenceSCALE: Activities may be completed 

with or without assistive devices.





6-Indepedent-patient completes the activity by him/herself with no assistance 

from a helper.


5-Set-up or Clean-up Assistance-helper sets up or cleans up; patient completes 

activity. [place] assists only prior to or  


    following the activity.


4-Supervision or Touching Assistance-helper provides verbal cues and/or 

touching/steadying and/or contact guard assistance as patient completes 

activity. Assistance may be provided   


    throughout the activity or intermittently.


3-Partial/Moderate Assistance-helper does LESS THAN HALF the effort. [place] 

lifts, holds or supports trunk or limbs, but provides less than half the effort.


2-Substantial/Maximal Assistance-helper does MORE THAN HALF the effort. [place] 

lifts or holds trunk or limbs and provides more than half the effort.


4-Degvzjvqw-alptwo does ALL the effort. Patient does none of the effort to 

complete the activity. Or, the assistance of 2 or more helpers is required for 

the patient to complete the  


    activity.


If activity was not attempted, code reason:


7-Patient Refused.


9-Not Applicable-not attempted and the patient did not perform the activity 

before the current illness, exacerbation or injury.


10-Not Attempted due to Environmental Limitations-(lack of equipment, weather 

restraints, etc.).


88-Not Attempted due to Medical Conditions or Safety Concerns.





Other Treatment


OT/PT complete co-treatment with pt. due to need of two skilled therapists.  

OT/PT attempt to engage with pt., asking him questions.  Pt. able to smile at 

times, and able to mouth words.  Pt. is unable to verbalize pain or comfort 

level.  OT completes UE PROM, and AAROM in all planes, with encouragement to do 

for self.  Pt. able to squeeze OT's hands, and complete slight wrist extension 

exercises as well as bicep flexion exercises.  Pt. becomes fatigued quickly.  PT

completes gentle PROM to bilateral LE.  Pt. closes eyes as though he wants to 

rest.  Nursing assesses pt. and RT completes assessment.  Pt. put back onto 

bipap machine at this time.  Encouraged pt. to assist with rolling/bed mobility.

 Pt. unable to do so and required dependent assist x 2 for rolling side to side,

and positioning in bed.  Staff came in to do chest x-ray.





Education


OT Patient Education:  Correct positioning, Exercise program, Purpose of 

tx/functional activities, Reviewed precautions, Transfer techniques


Teaching Recipient:  Patient


Teaching Methods:  Demonstration, Discussion


Response to Teaching:  Unable to Return Demonstration, Reinforcement Needed





OT Short Term Goals


Short Term Goals


Eatin


Oral hygiene:  5


Upper body dressin





OT Long Term Goals


Long Term Goals


Time Frame:  2020


Eating (QC):  6


Oral Hygiene (QC):  6


Toileting Hygiene (QC):  3


Shower/Bathe Self (QC):  3


Upper Body Dressing (QC):  5


Lower Body Dressing (QC):  5


On/Off Footwear (QC):  4


Additional Goals:  1-Demonstrate ADL Tasks, 2-Verbalize Understanding, 3-

ImproveStrength/Moy


1=Demonstrate adherence to instructed precautions during ADL tasks.


2=Patient will verbalize/demonstrate understanding of assistive 

devices/modifications for ADL.


3=Patient will improve strength/tolerance for activity to enable patient to 

perform ADL's.





OT Education/Plan


Problem List/Assessment


Assessment:  Decreased Activ Tolerance, Decreased UE Strength, Dependent 

Transfers, Impaired Bed Mobility, Impaired Cognition, Impaired Coordination, 

Impaired Funct Balance, Impaired I ADL's, Impaired Self-Care Skills, Restricted 

Funct UE ROM





Discharge Recommendations


Plan/Recommendations:  Continue POC


Therapy Discharge Recommendati:  24 Hour Supervision





Treatment Plan/Plan of Care


Treatment,Training & Education:  Yes


Patient would benefit from OT for education, treatment and training to promote 

independence in ADL's, mobility, safety and/or upper extremity function for 

ADL's.


Plan of Care:  ADL Retraining, Functional Mobility, UE Funct Exercise/Act, W/C 

Management Training


Treatment Duration:  2020


Frequency:  5 times per week


Estimated Hrs Per Day:  .25 hour per day


Agreement:  Yes


Rehab Potential:  Guarded





Time/GCodes


Start Time:  10:20


Stop Time:  11:10


Total Time Billed (hr/min):  50


Billed Treatment Time


1, FA x 15minutes, Ex x 10minutes- Co-treatment with PT.


50(25) Please see above note for designated roles.











EDITH CATES OT           2020 11:18

## 2020-04-22 NOTE — NUR
PT transferred to ARU per MD. staff let this RN know PT continues to abdominal breath, is 
SOA and unable to do therapy at this time. Dr Alarcon notified by ARU staff and by this RN. 
ARU nurse to give lasix and potassium and PT is to receive chest Xray. PT will return to 4th 
floor pending results

## 2020-04-22 NOTE — PULMONARY PROGRESS NOTE
Subjective


Time Seen by a Provider:  05:40


Subjective/Events-last exam


No complications noted.





Sepsis Event


Evaluation


Height, Weight, BMI


Height: '"


Weight: lbs. oz. kg; 27.00 BMI


Method:





Exam


Exam





Vital Signs








  Date Time  Temp Pulse Resp B/P (MAP) Pulse Ox O2 Delivery O2 Flow Rate FiO2


 


4/22/20 04:46 36.6 67 26 181/74 (109) 98 NIV Bilevel 30.00 


 


4/22/20 01:52  63 28  93  30.00 


 


4/22/20 00:46  69      


 


4/22/20 00:05 36.7 61 25 181/62 (101) 98 NIV Bilevel 30.00 


 


4/21/20 21:14  79 29  95  30.00 


 


4/21/20 19:54 36.5 74 22 153/66 (95) 98 High Flow N/C 7.00 


 


4/21/20 19:39      High Flow N/C 7.00 


 


4/21/20 19:25  78      


 


4/21/20 18:15     96 High Flow N/C 7.00 


 


4/21/20 18:12     96 High Flow N/C 7.00 


 


4/21/20 16:12 36.8 75 21 145/70 (95) 96 High Flow N/C 7.00 


 


4/21/20 14:51     94 High Flow N/C 7.00 


 


4/21/20 13:37  77      


 


4/21/20 12:00 36.0 72 22 175/82 (113) 98 NIV Bilevel  


 


4/21/20 10:59     98 NIV Bilevel  30


 


4/21/20 08:00      High Flow N/C 7.00 


 


4/21/20 08:00 37.1 73 18 164/84 (110) 99 NIV Bilevel  


 


4/21/20 07:44  79 28  98  40.00 














I & O 


 


 4/22/20





 07:00


 


Intake Total 2640 ml


 


Output Total 1050 ml


 


Balance 1590 ml








Height & Weight


Height: '"


Weight: lbs. oz. kg; 27.00 BMI


Method:


General Appearance:  No Apparent Distress, WD/WN, Other (Intubated and sedated)


HEENT:  Other (Endotracheal tube in place, OG tube in place)


Neck:  Normal Inspection, Supple


Respiratory:  Chest Non Tender, Crackles, Rhonci, Other (accessory muscle use- )


Cardiovascular:  Regular Rate, Rhythm, No Edema


Capillary Refill:  Less Than 3 Seconds


Gastrointestinal:  normal bowel sounds, non tender, soft, other (Ventral hernia)


Extremity:  Normal Inspection, Non Tender, No Pedal Edema


Neurologic/Psychiatric:  Alert, Oriented x3, Other (sedated)


Skin:  Normal Color, Warm/Dry





Results


Lab


Laboratory Tests


4/21/20 08:58














Assessment/Plan


Assessment/Plan


Acute respiratory failure with pneumonia with Severe sepsis - question of 

aspiration 


   -currenlty on rocephin 


   -extubated 4/19


   -Check BNP 


   -Start PT/OT 


   -Currently on BiPAP - trial back to NC


   -Check BNP 


   -duoneb and solumedrol 


   -Pan cultures pending 


   -influenza is negative 


MRSA is negative - D.c Vanco 


   -COVID is neg





Anemia 


   -Monitor











LENORA SLADE DO              Apr 22, 2020 05:44

## 2020-04-22 NOTE — NUR
CM/SS finalized plan



Plan: The patient is going to Inpatient Rehab. 



CM/SS contacted Ann to inform her of the plan. She is agreeable to the plan and stated 
she does not have any other needs at this time. She verbalized that they have already moved 
everything out of [facility].

## 2020-04-22 NOTE — DIAGNOSTIC IMAGING REPORT
INDICATION: Right lung crackles.



Time of exam 11:10 AM



Correlation is made with prior chest from 04/19/2020.



Heart size is stable. There are patchy airspace infiltrates

bilateral lung bases. This is similar on the right. This appears

to be increased on the left since prior. Upper lung fields are

clear. There is no pneumothorax.



IMPRESSION: Bibasilar infiltrates, increasing on the left when

compared to examination 3 days earlier.



Dictated by: 



  Dictated on workstation # [id number]

## 2020-04-22 NOTE — PHYSICAL THERAPY DAILY NOTE
PT Daily Note-Current


Subjective


The patient has difficulty communicating.





Transfers


SCALE: Activities may be completed with or without assistive devices.





6-Indepedent-patient completes the activity by him/herself with no assistance 

from a helper.


5-Set-up or Clean-up Assistance-helper sets up or cleans up; patient completes 

activity. [place] assists only prior to or  


    following the activity.


4-Supervision or Touching Assistance-helper provides verbal cues and/or 

touching/steadying and/or contact guard assistance as patient completes 

activity. Assistance may be provided   


    throughout the activity or intermittently.


3-Partial/Moderate Assistance-helper does LESS THAN HALF the effort. [place] 

lifts, holds or supports trunk or limbs, but provides less than half the effort.


2-Substantial/Maximal Assistance-helper does MORE THAN HALF the effort. [place] 

lifts or holds trunk or limbs and provides more than half the effort.


9-Eiirzwnux-czhwke does ALL the effort. Patient does none of the effort to 

complete the activity. Or, the assistance of 2 or more helpers is required for 

the patient to complete the  


    activity.


If activity was not attempted, code reason:


7-Patient Refused.


9-Not Applicable-not attempted and the patient did not perform the activity 

before the current illness, exacerbation or injury.


10-Not Attempted due to Environmental Limitations-(lack of equipment, weather 

restraints, etc.).


88-Not Attempted due to Medical Conditions or Safety Concerns.


Roll Left & Right (QC):  2


Co-treat with OT also included bed mobility training.





Exercises


Supine Ex:  LE Protocol


Supine Reps:  20





Assessment


Current Status:  Fair Progress


The patient had significant SOB and abdominal breathing during treatment.





PT Long Term Goals


Long Term Goals


PT Long Term Goals Time Frame:  May 9, 2020


Roll Left & Right (QC):  4


Sit to Lying (QC):  4


Lying-Sitting on Side/Bed(QC):  4


Sit to Stand (QC):  3


Chair/Bed-to-Chair Xfer(QC):  3


Toilet Transfer (QC):  3


Does the Patient Walk:  No and Walking Goal IS indicated


Walk 10 feet (QC):  3





PT Plan


Treatment/Plan


Treatment Plan:  Continue Plan of Care


Treatment Plan:  Bed Mobility, Education, Functional Activity Moy, Functional 

Strength, Gait, Safety, Therapeutic Exercise, Transfers


Treatment Duration:  May 9, 2020


Frequency:  6 times per week


Estimated Hrs Per Day:  .5 hour per day


Patient and/or Family Agrees t:  Yes





Time/GCodes


Time In:  1020


Time Out:  1110


Total Billed Treatment Time:  25


Total Billed Treatment


1, EX x 10' FA x 15' (co-treat with OT with total minutes of 50')











TAN CHRISTIE PT            Apr 22, 2020 11:19

## 2020-04-22 NOTE — PROGRESS NOTE
Subjective


Subjective


Date Seen by Provider:  2020


Time Seen by Provider:  09:00


84 yo M


-initially accepted to inpt rehab but on re-evaluation he is not stable enough 

to be accepted.  


-patient this AM reported he would like to get out of the hospital and that he 

is not in a good mood.


-he also said a couple things I could not understand.





Review of Systems


General:  No Chills, No Night Sweats


HEENT:  No Head Aches, No Visual Changes


Pulmonary:  No Dyspnea; Cough


Cardiovascular:  No: Chest Pain, Palpitations


Gastrointestinal:  No: Nausea, Vomiting


Genitourinary:  No Dysuria, No Frequency


Musculoskeletal:  No: neck pain


Neurological:  Weakness





All Other Systems Reviewed


All Other Systems Reviewed:  Yes





Objective


Exam


Vital Signs





Vital Signs








  Date Time  Temp Pulse Resp B/P (MAP) Pulse Ox O2 Delivery O2 Flow Rate FiO2


 


20 12:27  72      


 


20 12:23 36.4 92 24 158/71 (100) 96 NIV Bilevel  


 


20 10:49  68 32  93  30.00 


 


20 08:00      High Flow N/C 5.00 


 


20 07:22 36.6 81 24 161/73 (102) 96 Nasal Cannula 7.00 


 


20 06:42  77      


 


20 06:22  73 30  93   


 


20 06:17  68 32  93  30.00 


 


20 04:46 36.6 67 26 181/74 (109) 98 NIV Bilevel 30.00 


 


20 01:52  63 28  93  30.00 


 


20 00:46  69      


 


20 00:05 36.7 61 25 181/62 (101) 98 NIV Bilevel 30.00 


 


20 21:14  79 29  95  30.00 


 


20 19:54 36.5 74 22 153/66 (95) 98 High Flow N/C 7.00 


 


20 19:39      High Flow N/C 7.00 


 


20 19:25  78      


 


20 18:15     96 High Flow N/C 7.00 


 


20 18:12     96 High Flow N/C 7.00 


 


20 16:12 36.8 75 21 145/70 (95) 96 High Flow N/C 7.00 


 


20 14:51     94 High Flow N/C 7.00 














I & O 


 


 20





 07:00


 


Intake Total 2740 ml


 


Output Total 1750 ml


 


Balance 990 ml








General Appearance:  Mild Distress (respiratory)


Eyes:  Bilateral Eye PERRL, Bilateral Eye Other (Bilateral mattering mild)


HEENT:  PERRL/EOMI


Neck:  Normal Inspection, Supple


Respiratory:  Chest Non Tender, Accessory Muscle Use, Decreased Breath Sounds 

(bases)


Cardiovascular:  Regular Rate, Rhythm, No Edema


Gastrointestinal:  Normal Bowel Sounds, Soft


Rectal:  Deferred


Extremity:  Normal Inspection, Non Tender, No Pedal Edema


Neurologic/Psychiatric:  Alert, Oriented x3, Other (sedated)


Skin:  Normal Color, Warm/Dry





Results


Lab


Laboratory Tests


20 05:20: 


White Blood Count 10.8, Red Blood Count 3.26L, Hemoglobin 9.3L, Hematocrit 30L, 

Mean Corpuscular Volume 93, Mean Corpuscular Hemoglobin 29, Mean Corpuscular 

Hemoglobin Concent 31L, Red Cell Distribution Width 15.2H, Platelet Count 268, 

Mean Platelet Volume 10.3, Neutrophils (%) (Auto) 85H, Lymphocytes (%) (Auto) 8L

, Monocytes (%) (Auto) 6, Eosinophils (%) (Auto) 0, Basophils (%) (Auto) 0, 

Neutrophils # (Auto) 9.2H, Lymphocytes # (Auto) 0.9L, Monocytes # (Auto) 0.7, 

Eosinophils # (Auto) 0.0, Basophils # (Auto) 0.0, Sodium Level 146H, Potassium 

Level 4.0, Chloride Level 113H, Carbon Dioxide Level 25, Anion Gap 8, Blood Urea

Nitrogen 33H, Creatinine 0.77, Estimat Glomerular Filtration Rate > 60, 

BUN/Creatinine Ratio 43, Glucose Level 108H, Calcium Level 8.5, B-Type 

Natriuretic Peptide 1232.2H





Microbiology


20 Gram Stain - Final, Complete


          


20 Sputum Culture - Final, Complete


          Usual upper respiratory joe


          Haemophilus influenza


20 Urine Culture - Final, Complete


          NO GROWTH


20 Blood Culture - Final, Complete


          Haemophilus influenza





Assessment/Plan


Assessment/Plan


Assessment and Plan


20   weaning trial started but rr increased and oxygen saturation declined 

he was placed back on AC and sedated


OG tube is bringing up bloody secretions    -lovenox held.   Pt is on PPI BID IV

        monitoring Hgb.


Continue Vanc/zosyn/azithromycin-    1 blood culture is going gram neg-  sent 

off to [facility]


blood pressure is better-  pressors are off.


-steroids started for his COPD and respiratory failure- but steroids will not be

good for his GI bleed.    


Dr. Peña consulted.





20-  extubated.   No more blood per OG tube


-blood cultures growing gram neg hemophilus species beta lactamase +   he is on 

zosyn which appears to be working- for the pneumonia and bacteremia.    If we 

need better coverage meropenem would be what we switch to.


-bedside swallow test





20-  hypernatremia starting IVF  NS


changed ceftriaxone as it will treat both his H.infl pneumonia and bacteremia


-need to get him back to nasal cannula (baseline is 2-3 L oxygen NC) and then 

d/c to home.





20-  Na a little improved with IVF.  He is drinking water without issue and

eating.  


-Inpatient rehab to evaluate him other possible discharge plans include back to 

health and rehab; daughter also voiced she planned on taking him home.


-need to get him closer to his baseline oxygen requirement of 2-3L.


-will d/c his IVF since he taking po.


-pneumonia, bacteremia (H. influ)  covered with rocephin.





20  BNP elevated. Diuresing.


Still belly breathing and using accessory muscles.  Respiratory status has 

improved though from admission he has went from intubated to biPAP to HFNC and 

this AM he was on 5L oxygen via NC.


on methyprednisolone 40mg BID








Dispo:  improved but guarded.


Problems:  


(1) Pneumonia


Qualifiers:  


   


(2) Acute on chronic respiratory failure with hypoxia and hypercapnia


(3) Acute kidney injury


Assessment & Plan:  improved





(4) COPD with acute lower respiratory infection


(5) GI bleed


Assessment & Plan:  trending hgb-   does not appear to be a brisk bleed- could 

be gastritis along with irritation from placement of OG.





(6) Bacteremia due to Gram-negative bacteria


(7) Acute hypernatremia





Clinical Quality Measures


DVT/VTE Risk/Contraindication:


Risk Factor Score Per Nursin


RFS Level Per Nursing on Admit:  4+=Very High











ISAI RAGSDALE MD              2020 11:17

## 2020-04-22 NOTE — DISCHARGE SUMMARY
Discharge Summary


Hospital Course


Problems/Dx:  


(1) Pneumonia


Status:  Acute


Qualifiers:  


   


(2) Acute on chronic respiratory failure with hypoxia and hypercapnia


Status:  Acute


(3) Acute kidney injury


Status:  Acute


(4) COPD with acute lower respiratory infection


Status:  Acute


(5) GI bleed


(6) Bacteremia due to Gram-negative bacteria


(7) Acute hypernatremia


Hospital Course


Date of Admission: 2020 at 01:30 


Admission Diagnosis :  





Family Physician/Provider: Arturo Archer DO  





Date of Discharge: 20 


Discharge Diagnosis: [ ]








Hospital Course:


86 yo M admitted for acute on chronic hypoxic respiratory failure due to 

Haemophilus Influenza.  Blood cultures were also positive for H. Influ.  He went

from being intubated to BiPAP, to HFNC which he still is using BiPAP at night.  

On discharge to Inpt Rehab he was on 5L NC which his baseline is 2-3L oxygen via

NC.





He will be on rocephin 1gm daily until 20 and his iv methylprednisolone is 

to be changed to 10mg dexamethasone daily x 3 days.  Respiratory status will 

need to be monitored.  Hypernatremia improved with IVF (1/2 NS) they were 

stopped yesterday and he is drinking water. Monitor Sodium on labs.














Labs and Pending Lab Test:


Laboratory Tests


20 05:20: 


White Blood Count 10.8, Red Blood Count 3.26L, Hemoglobin 9.3L, Hematocrit 30L, 

Mean Corpuscular Volume 93, Mean Corpuscular Hemoglobin 29, Mean Corpuscular Hem

oglobin Concent 31L, Red Cell Distribution Width 15.2H, Platelet Count 268, Mean

Platelet Volume 10.3, Neutrophils (%) (Auto) 85H, Lymphocytes (%) (Auto) 8L, 

Monocytes (%) (Auto) 6, Eosinophils (%) (Auto) 0, Basophils (%) (Auto) 0, 

Neutrophils # (Auto) 9.2H, Lymphocytes # (Auto) 0.9L, Monocytes # (Auto) 0.7, 

Eosinophils # (Auto) 0.0, Basophils # (Auto) 0.0, Sodium Level 146H, Potassium 

Level 4.0, Chloride Level 113H, Carbon Dioxide Level 25, Anion Gap 8, Blood Urea

Nitrogen 33H, Creatinine 0.77, Estimat Glomerular Filtration Rate > 60, 

BUN/Creatinine Ratio 43, Glucose Level 108H, Calcium Level 8.5, B-Type 

Natriuretic Peptide 1232.2H





Microbiology


20 Gram Stain - Final, Complete


          


20 Sputum Culture - Final, Complete


          Usual upper respiratory joe


          Haemophilus influenza


20 Urine Culture - Final, Complete


          NO GROWTH


20 Blood Culture - Final, Complete


          Haemophilus influenza





Home Meds


Active


Reported


Tramadol HCl 50 Mg Tablet 50 Mg PO TID


Metoprolol Succinate 25 Mg Tab.er.24h 25 Mg PO DAILY


     HOLD FOR SBP LESS THAN 110-60 OR AP LESS THEN 60 BPM


Terazosin HCl 2 Mg Capsule 2 Mg PO HS


Potassium Chloride 20 Meq Tablet.er 20 Meq PO DAILY


Protonix (Pantoprazole Sodium) 40 Mg Tablet.dr 40 Mg PO DAILY


Lisinopril 10 Mg Tablet 10 Mg PO DAILY


Iprat-Albut 0.5-3(2.5) mg/3 ml (Ipratropium/Albuterol Sulfate) 3 Ml Ampul.neb 3 

Ml IH Q12H PRN


Mucinex (Guaifenesin) 1,200 Mg Tab.er.12h 1,200 Mg PO Q12H


Lasix (Furosemide) 40 Mg Tablet 60 Mg PO DAILY


     TAKES 1 &  OF A 40MG TAB TO EQUAL 60MG DAILY


Colace (Docusate Sodium) 100 Mg Capsule 100 Mg PO DAILY PRN


Vitamin D3 (Cholecalciferol (Vitamin D3)) 25 Mcg Capsule 25 Mcg PO DAILY


Bisacodyl 10 Mg Supp.rect 10 Mg RC DAILY PRN


Azithromycin 250 Mg Tablet 250 Mg PO DAILY


     GIVE UNTIL 2020


Aspirin EC (Aspirin) 81 Mg Tablet.dr 81 Mg PO DAILY


Albuterol Sulfate 2.5 Mg/0.5 Ml Vial.neb 2.5 Mg INH Q4H


Albuterol Sulfate 2.5 Mg/0.5 Ml Vial.neb 2.5 Mg INH Q6H


Advair 250-50 Diskus (Fluticasone/Salmeterol) 1 Each Blst.w.dev 1 Each IH BID


     RINSE MOUTH AFTER EACH USE


Tylenol Extra Strength (Acetaminophen) 500 Mg Tablet 500-1,000 Mg PO Q6H PRN


Discharge Planning:  <30 minutes discharge planning





Discharge Instructions


Discharge Diet:  Other Diet (Dysphagia II )


Activity as Tolerated:  Yes





Discharge Physical Examination


Vital Signs





Vital Signs








  Date Time  Temp Pulse Resp B/P (MAP) Pulse Ox O2 Delivery O2 Flow Rate FiO2


 


20 07:22 36.6 81 24 161/73 (102) 96 Nasal Cannula 7.00 


 


20 10:59        30








General Appearance:  Mild Distress (respiratory)


HEENT:  PERRL/EOMI


Respiratory:  Chest Non Tender, Decreased Breath Sounds (bases)


Allergies:  


Coded Allergies:  


     No Known Drug Allergies (Unverified , 3/13/20)





Discharge Summary


Date of Admission


2020 at 01:30


Date of Discharge





Admission Diagnosis


Septic shock


Discharge Diagnosis





(1) Pneumonia


Status:  Acute


Qualifiers:  


   


(2) Acute on chronic respiratory failure with hypoxia and hypercapnia


Status:  Acute


(3) Acute kidney injury


Status:  Acute


Assessment & Plan:  improved





(4) COPD with acute lower respiratory infection


Status:  Acute


(5) GI bleed


Assessment & Plan:  trending hgb-   does not appear to be a brisk bleed- could 

be gastritis along with irritation from placement of OG.





(6) Bacteremia due to Gram-negative bacteria


(7) Acute hypernatremia





Clinical Quality Measures


DVT/VTE Risk/Contraindication:


Risk Factor Score Per Nursin


RFS Level Per Nursing on Admit:  4+=Very High











ISAI RAGSDALE MD              2020 09:20

## 2020-04-23 VITALS — DIASTOLIC BLOOD PRESSURE: 88 MMHG | SYSTOLIC BLOOD PRESSURE: 176 MMHG

## 2020-04-23 VITALS — DIASTOLIC BLOOD PRESSURE: 74 MMHG | SYSTOLIC BLOOD PRESSURE: 161 MMHG

## 2020-04-23 VITALS — DIASTOLIC BLOOD PRESSURE: 86 MMHG | SYSTOLIC BLOOD PRESSURE: 176 MMHG

## 2020-04-23 VITALS — DIASTOLIC BLOOD PRESSURE: 87 MMHG | SYSTOLIC BLOOD PRESSURE: 178 MMHG

## 2020-04-23 VITALS — DIASTOLIC BLOOD PRESSURE: 83 MMHG | SYSTOLIC BLOOD PRESSURE: 184 MMHG

## 2020-04-23 LAB
ALBUMIN SERPL-MCNC: 2.7 GM/DL (ref 3.2–4.5)
ALP SERPL-CCNC: 43 U/L (ref 40–136)
ALT SERPL-CCNC: 22 U/L (ref 0–55)
BILIRUB SERPL-MCNC: 0.3 MG/DL (ref 0.1–1)
BUN/CREAT SERPL: 39
CALCIUM SERPL-MCNC: 8.5 MG/DL (ref 8.5–10.1)
CHLORIDE SERPL-SCNC: 110 MMOL/L (ref 98–107)
CO2 SERPL-SCNC: 28 MMOL/L (ref 21–32)
CREAT SERPL-MCNC: 0.8 MG/DL (ref 0.6–1.3)
GFR SERPLBLD BASED ON 1.73 SQ M-ARVRAT: > 60 ML/MIN
GLUCOSE SERPL-MCNC: 134 MG/DL (ref 70–105)
MAGNESIUM SERPL-MCNC: 2.3 MG/DL (ref 1.6–2.4)
PHOSPHATE SERPL-MCNC: 2.7 MG/DL (ref 2.3–4.7)
POTASSIUM SERPL-SCNC: 3.8 MMOL/L (ref 3.6–5)
PROT SERPL-MCNC: 5.4 GM/DL (ref 6.4–8.2)
SODIUM SERPL-SCNC: 149 MMOL/L (ref 135–145)

## 2020-04-23 RX ADMIN — PANTOPRAZOLE SODIUM SCH MG: 40 INJECTION, POWDER, FOR SOLUTION INTRAVENOUS at 08:40

## 2020-04-23 RX ADMIN — DEXTROSE MONOHYDRATE SCH MLS/HR: 5 INJECTION, SOLUTION INTRAVENOUS at 16:23

## 2020-04-23 RX ADMIN — BUDESONIDE SCH MG: 0.5 SUSPENSION RESPIRATORY (INHALATION) at 18:31

## 2020-04-23 RX ADMIN — MICONAZOLE NITRATE SCH APPLIC: 20 POWDER TOPICAL at 08:53

## 2020-04-23 RX ADMIN — IPRATROPIUM BROMIDE AND ALBUTEROL SULFATE SCH ML: .5; 3 SOLUTION RESPIRATORY (INHALATION) at 18:31

## 2020-04-23 RX ADMIN — IPRATROPIUM BROMIDE AND ALBUTEROL SULFATE SCH ML: .5; 3 SOLUTION RESPIRATORY (INHALATION) at 02:38

## 2020-04-23 RX ADMIN — IPRATROPIUM BROMIDE AND ALBUTEROL SULFATE SCH ML: .5; 3 SOLUTION RESPIRATORY (INHALATION) at 13:39

## 2020-04-23 RX ADMIN — METHYLPREDNISOLONE SODIUM SUCCINATE SCH MG: 40 INJECTION, POWDER, FOR SOLUTION INTRAMUSCULAR; INTRAVENOUS at 21:00

## 2020-04-23 RX ADMIN — PANTOPRAZOLE SODIUM SCH MG: 40 INJECTION, POWDER, FOR SOLUTION INTRAVENOUS at 21:00

## 2020-04-23 RX ADMIN — LORAZEPAM PRN MG: 2 INJECTION INTRAMUSCULAR; INTRAVENOUS at 23:12

## 2020-04-23 RX ADMIN — ENOXAPARIN SODIUM SCH MG: 100 INJECTION SUBCUTANEOUS at 05:28

## 2020-04-23 RX ADMIN — BUDESONIDE SCH MG: 0.5 SUSPENSION RESPIRATORY (INHALATION) at 06:25

## 2020-04-23 RX ADMIN — IPRATROPIUM BROMIDE AND ALBUTEROL SULFATE SCH ML: .5; 3 SOLUTION RESPIRATORY (INHALATION) at 06:25

## 2020-04-23 RX ADMIN — METHYLPREDNISOLONE SODIUM SUCCINATE SCH MG: 40 INJECTION, POWDER, FOR SOLUTION INTRAMUSCULAR; INTRAVENOUS at 08:40

## 2020-04-23 RX ADMIN — IPRATROPIUM BROMIDE AND ALBUTEROL SULFATE SCH ML: .5; 3 SOLUTION RESPIRATORY (INHALATION) at 22:08

## 2020-04-23 RX ADMIN — DEXTROSE MONOHYDRATE SCH MLS/HR: 5 INJECTION, SOLUTION INTRAVENOUS at 08:40

## 2020-04-23 RX ADMIN — DEXTROSE MONOHYDRATE SCH MLS/HR: 5 INJECTION, SOLUTION INTRAVENOUS at 19:20

## 2020-04-23 RX ADMIN — MICONAZOLE NITRATE SCH APPLIC: 20 POWDER TOPICAL at 21:01

## 2020-04-23 RX ADMIN — SODIUM CHLORIDE SCH MLS/HR: 900 INJECTION INTRAVENOUS at 12:55

## 2020-04-23 NOTE — NUR
CM/SS follow up. 



CM/SS has visited with the daughter Ann strickland today to discuss discharge for the patient. 
The patient's physician stated that the patient would benefit from going to [facility] or 
another LTACH due to patient needing longer term care for respiratory and physical therapy. 
CM/SS informed Ann of this. Ann had questions that this ss answered to her best 
abilities. Ann stated that she would get back to this ss with an answer. Ann contacted 
this SS stating that they were unwilling to transfer the patient to [facility] at this time 
due to negative comments she has heard. Ann had additional questions that needed answered 
from a Nurse. She also stated that she was not willing to have the patient go into another 
facility and was going to bring him home. CM/SS discussed with the patient home health and 
hospice. 



CM/SS asked RN, Magda to assist in answering Ann's questions. Magda answered all 
of Ann's questions. She stated she would call this SS back today with what they decided 
at this time. 



CM/SS visited with the patient. He appeared to have a difficult time talking and was 
difficult for this CM/SS to understand due to volume of speech. The patient could respond 
"Yes" or "No" to questions this SS asked. CM/SS will continue to follow and speak with 
patient and family for discharge planning.

## 2020-04-23 NOTE — PULMONARY PROGRESS NOTE
Subjective


Time Seen by a Provider:  06:12


Subjective/Events-last exam


No complications noted.





Sepsis Event


Evaluation


Height, Weight, BMI


Height: '"


Weight: lbs. oz. kg; 27.00 BMI


Method:





Exam


Exam





Vital Signs








  Date Time  Temp Pulse Resp B/P (MAP) Pulse Ox O2 Delivery O2 Flow Rate FiO2


 


4/23/20 04:25 36.6 90 34 184/83 (116) 93 NIV Bilevel 30.00 


 


4/23/20 02:38  61 32  98  30.00 


 


4/23/20 01:00  80      


 


4/23/20 00:00 36.7       


 


4/22/20 22:07  76 27  95  30.00 


 


4/22/20 20:51 37.1 76 26 189/101 (130) 97 NIV Bilevel  


 


4/22/20 20:30     95 NIV Bilevel 5.00 


 


4/22/20 19:00  80      


 


4/22/20 18:25  76 28  94  30.00 


 


4/22/20 16:00 36.8 69 28 172/73 (106) 95 NIV Bilevel  


 


4/22/20 14:40  65 25  93  30.00 


 


4/22/20 12:27  72      


 


4/22/20 12:23 36.4 92 24 158/71 (100) 96 NIV Bilevel  


 


4/22/20 10:49  68 32  93  30.00 


 


4/22/20 08:00      High Flow N/C 5.00 


 


4/22/20 07:22 36.6 81 24 161/73 (102) 96 Nasal Cannula 7.00 


 


4/22/20 06:42  77      


 


4/22/20 06:22  73 30  93   


 


4/22/20 06:17  68 32  93  30.00 














I & O 


 


 4/23/20





 07:00


 


Intake Total 500 ml


 


Output Total 3550 ml


 


Balance -3050 ml








Height & Weight


Height: '"


Weight: lbs. oz. kg; 27.00 BMI


Method:


General Appearance:  Mild Distress (respiratory)


HEENT:  PERRL/EOMI


Neck:  Normal Inspection, Supple


Respiratory:  Chest Non Tender, Accessory Muscle Use, Decreased Breath Sounds 

(bases)


Cardiovascular:  Regular Rate, Rhythm, No Edema


Capillary Refill:  Less Than 3 Seconds


Gastrointestinal:  normal bowel sounds, non tender, soft, other (Ventral hernia)


Extremity:  Normal Inspection, Non Tender, No Pedal Edema


Neurologic/Psychiatric:  Alert, Oriented x3, Other (sedated)


Skin:  Normal Color, Warm/Dry





Results


Lab


Laboratory Tests


4/21/20 08:58








4/22/20 05:20








4/22/20 16:23








4/23/20 05:00











Assessment/Plan


Assessment/Plan


Acute respiratory failure with pneumonia with Severe sepsis - improving 


   -currenlty on rocephin 


   -Start PT/OT


   -duoneb and solumedrol 


   -Pan cultures pending 


   -influenza is negative 


MRSA is negative - D.c Vanco 


   -COVID is neg


Hypernatremia


   -Start D5 W 





Anemia 


   -Monitor











LENORA SLADE DO              Apr 23, 2020 06:14

## 2020-04-23 NOTE — DIAGNOSTIC IMAGING REPORT
Indication: Shortness of breath



Portable chest 4:15 AM



Right IJ central line tip projects over the SVC. There is some

right basilar interstitial infiltrate. There is some alveolar

consolidation left lung base. There may be tiny left effusion.

There is no pneumothorax.



IMPRESSION: Right basilar interstitial infiltrate has improved

since the previous day. There continues be some alveolar

consolidation at the left lung base with probable small effusion

that appears unchanged from the previous day.



Dictated by: 



  Dictated on workstation # [id number]

## 2020-04-23 NOTE — PHYSICAL THERAPY DAILY NOTE
PT Daily Note-Current


Subjective


Pt laying Supine in bed on Bipap upon arrival. Pt. able to open eyes and 

attempts to speak through mask.  Nods to therapists when it is explained about 

participating in therapy.


 Pt agrees to PT/OT co-treat.





Mental Status


Patient Orientation:  Person, [place]


Attachments:  Oxygen, [place] Catheter, Other-See Comments (Telemetry), IV





Transfers


SCALE: Activities may be completed with or without assistive devices.





6-Indepedent-patient completes the activity by him/herself with no assistance 

from a helper.


5-Set-up or Clean-up Assistance-helper sets up or cleans up; patient completes 

activity. [place] assists only prior to or  


    following the activity.


4-Supervision or Touching Assistance-helper provides verbal cues and/or 

touching/steadying and/or contact guard assistance as patient completes 

activity. Assistance may be provided   


    throughout the activity or intermittently.


3-Partial/Moderate Assistance-helper does LESS THAN HALF the effort. [place] 

lifts, holds or supports trunk or limbs, but provides less than half the effort.


2-Substantial/Maximal Assistance-helper does MORE THAN HALF the effort. [place] 

lifts or holds trunk or limbs and provides more than half the effort.


3-Ewgwivnrt-hafvoe does ALL the effort. Patient does none of the effort to 

complete the activity. Or, the assistance of 2 or more helpers is required for 

the patient to complete the  


    activity.


If activity was not attempted, code reason:


7-Patient Refused.


9-Not Applicable-not attempted and the patient did not perform the activity 

before the current illness, exacerbation or injury.


10-Not Attempted due to Environmental Limitations-(lack of equipment, weather 

restraints, etc.).


88-Not Attempted due to Medical Conditions or Safety Concerns.





Treatments


Pt. seen by OT/PT due to need of skilled assistance x 2.  Pt. transferred 

supine-sit with max x 2.  OT applied sheet around pt's trunk area, and sat in 

front, providing support with sheet to bring trunk forward, while PT provided 

support from behind pt. to work on upright stability and sitting balance.  Pt. 

able to sit approximately 20 minutes on side of bed with max support.  Pt. 

encouraged to complete bilateral UE exercises to tolerance level.  Pt. able to 

open/close bilateral hands, and attempted to raise each arm with 15 degrees 

shoulder flexion.  Pt. required dependent assist x 2 for sit-supine, and bed 

mobility.  Pt. points to banana on bedside table.  Nursing okay with therapy 

attempted to feed pt.  Bipap is removed momentarily and pt's face is washed.  

Pt. is given very small piece of banana.  PT facilitates head position while OT 

attempts to have pt. feed self.  Pt. unable to hold banana.  OT places piece in 

pt's mouth.  No dentures noted and pt. chews banana multiple times without 

swallowing.  Nurse aide brings in water cup with straw.  Pt. able to drink with 

no issues noted. Swallows banana. HOB is in raised position.  Pt. declines any 

further banana.  Pt. is positioned for comfort, including bilateral UE elevated 

due to edema.





Assessment


Current Status:  Fair Progress


Pt tolerated sitting longer than previous Rx but still limited by weakness and 

SOA.  RT arrives at end of Rx and changes pt to 5L O2 instead of Bipap to test 

pt's tolerance.





PT Long Term Goals


Long Term Goals


PT Long Term Goals Time Frame:  May 9, 2020


Roll Left & Right (QC):  4


Sit to Lying (QC):  4


Lying-Sitting on Side/Bed(QC):  4


Sit to Stand (QC):  3


Chair/Bed-to-Chair Xfer(QC):  3


Toilet Transfer (QC):  3


Does the Patient Walk:  No and Walking Goal IS indicated


Walk 10 feet (QC):  3





PT Plan


Problem List


Problem List:  Activity Tolerance, Functional Strength, Safety, Balance, 

Transfer, Bed Mobility





Treatment/Plan


Treatment Plan:  Continue Plan of Care


Treatment Plan:  Bed Mobility, Education, Functional Activity Moy, Functional 

Strength, Gait, Safety, Therapeutic Exercise, Transfers


Treatment Duration:  May 9, 2020


Frequency:  6 times per week


Estimated Hrs Per Day:  .5 hour per day


Patient and/or Family Agrees t:  Yes





Safety Risks/Education


Patient Education:  Transfer Techniques, Correct Positioning, Safety Issues


Teaching Recipient:  Patient


Teaching Methods:  Discussion


Response to Teaching:  Reinforcement Needed





Time/GCodes


Time In:  1110


Time Out:  1140


Total Billed Treatment Time:  30


Total Billed Treatment


1, FAYE (15m)





Co-treat with OT for 30m











TOSHIA HICKS PTA              Apr 23, 2020 12:22

## 2020-04-23 NOTE — OCCUPATIONAL THER DAILY NOTE
OT Current Status-Daily Note


Subjective


No pain reported.





Appearance


Pt. in bed on bypap machine.  Pt. able to open eyes and attempts to speak 

through mask.  Nods to therapists when it is explained about participating in 

therapy.





Mental Status/Objective


Patient Orientation:  Unable to Assess


Attachments:  [place] Catheter, IV, Oxygen, Telemetry





ADL-Treatment


Therapy Code Descriptions/Definitions 





Functional [place] Measure:


0=Not Assessed/NA        4=Minimal Assistance


1=Total Assistance        5=Supervision or Setup


2=Maximal Assistance  6=Modified [place]


3=Moderate Assistance 7=Complete IndependenceSCALE: Activities may be completed 

with or without assistive devices.





6-Indepedent-patient completes the activity by him/herself with no assistance 

from a helper.


5-Set-up or Clean-up Assistance-helper sets up or cleans up; patient completes 

activity. [place] assists only prior to or  


    following the activity.


4-Supervision or Touching Assistance-helper provides verbal cues and/or 

touching/steadying and/or contact guard assistance as patient completes 

activity. Assistance may be provided   


    throughout the activity or intermittently.


3-Partial/Moderate Assistance-helper does LESS THAN HALF the effort. [place] 

lifts, holds or supports trunk or limbs, but provides less than half the effort.


2-Substantial/Maximal Assistance-helper does MORE THAN HALF the effort. [place] 

lifts or holds trunk or limbs and provides more than half the effort.


8-Cbrpzowmj-cwbepk does ALL the effort. Patient does none of the effort to 

complete the activity. Or, the assistance of 2 or more helpers is required for 

the patient to complete the  


    activity.


If activity was not attempted, code reason:


7-Patient Refused.


9-Not Applicable-not attempted and the patient did not perform the activity 

before the current illness, exacerbation or injury.


10-Not Attempted due to Environmental Limitations-(lack of equipment, weather 

restraints, etc.).


88-Not Attempted due to Medical Conditions or Safety Concerns.


Eating (QC):  2 (Please see note.)


Pt. seen by OT/PT due to need of skilled assistance x 2.  Pt. transferred 

supine-sit with max x 2.  OT applied sheet around pt's trunk area, and sat in fr

ont, providing support with sheet to bring trunk forward, while PT provided 

support from behind pt. to work on upright stability and sitting balance.  Pt. 

able to sit approximately 20 minutes on side of bed with max support.  Pt. 

encouraged to complete bilateral UE exercises to tolerance level.  Pt. able to 

open/close bilateral hands, and attempted to raise each arm with 15 degrees 

shoulder flexion.  Pt. required dependent assist x 2 for sit-supine, and bed 

mobility.  Pt. points to banana on bedside table.  Nursing okay with therapy 

attempted to feed pt.  Bipap is removed momentarily and pt's face is washed.  

Pt. is given very small piece of banana.  PT facilitates head position while OT 

attempts to have pt. feed self.  Pt. unable to hold banana.  OT places piece in 

pt's mouth.  No dentures noted and pt. chews banana multiple times without 

swallowing.  Nurse aide brings in water cup with straw.  Pt. able to drink with 

no issues noted. Swallows banana. HOB is in raised position.  Pt. declines any 

further banana.  Pt. is positioned for comfort, including bilateral UE elevated 

due to edema.





Education


OT Patient Education:  Correct positioning, Modified ADL techniques, Progress 

toward Goal/Update tx plan, Purpose of tx/functional activities, Reviewed 

precautions, Rehab process, Transfer techniques


Teaching Recipient:  Patient


Teaching Methods:  Demonstration, Discussion


Response to Teaching:  Verbalize Understanding, Return Demonstration





OT Short Term Goals


Short Term Goals


Eatin


Oral hygiene:  5


Upper body dressin





OT Long Term Goals


Long Term Goals


Time Frame:  2020


Eating (QC):  6


Oral Hygiene (QC):  6


Toileting Hygiene (QC):  3


Shower/Bathe Self (QC):  3


Upper Body Dressing (QC):  5


Lower Body Dressing (QC):  5


On/Off Footwear (QC):  4


Additional Goals:  1-Demonstrate ADL Tasks, 2-Verbalize Understanding, 3-

ImproveStrength/Moy


1=Demonstrate adherence to instructed precautions during ADL tasks.


2=Patient will verbalize/demonstrate understanding of assistive 

devices/modifications for ADL.


3=Patient will improve strength/tolerance for activity to enable patient to 

perform ADL's.





OT Education/Plan


Problem List/Assessment


Assessment:  Decreased Activ Tolerance, Decreased UE Strength, Dependent 

Transfers, Edema, Impaired Bed Mobility, Impaired Cognition, Impaired 

Coordination, Impaired Funct Balance, Impaired I ADL's, Impaired Self-Care 

Skills, Restricted Funct UE ROM





Discharge Recommendations


Plan/Recommendations:  Continue POC


Therapy Discharge Recommendati:  24 Hour Supervision





Treatment Plan/Plan of Care


Treatment,Training & Education:  Yes


Patient would benefit from OT for education, treatment and training to promote 

independence in ADL's, mobility, safety and/or upper extremity function for 

ADL's.


Plan of Care:  ADL Retraining, Functional Mobility, UE Funct Exercise/Act, W/C 

Management Training


Treatment Duration:  2020


Frequency:  5 times per week


Estimated Hrs Per Day:  .25 hour per day


Agreement:  Yes


Rehab Potential:  Guarded





Time/GCodes


Start Time:  11:10


Stop Time:  11:40


Total Time Billed (hr/min):  30


Billed Treatment Time


1, FA x 15minutes (30 minute total co-treat with PT.)


Please see above note for designated roles.











EDITH CATES OT           2020 13:15

## 2020-04-23 NOTE — PROGRESS NOTE
Subjective


Subjective


Date Seen by Provider:  2020


Time Seen by Provider:  08:30


86 yo M


on BiPAP sleeping.


He is not working as hard to breath


Diuresed well.





Review of Systems


ROS Unable to Obtain:  asleep


General:  No Chills, No Night Sweats


HEENT:  No Head Aches, No Visual Changes


Pulmonary:  No Dyspnea; Cough


Cardiovascular:  No: Chest Pain, Palpitations


Gastrointestinal:  No: Nausea, Vomiting


Genitourinary:  No Dysuria, No Frequency


Musculoskeletal:  No: neck pain


Neurological:  Weakness





All Other Systems Reviewed


All Other Systems Reviewed:  Yes





Objective


Exam


Vital Signs





Vital Signs








  Date Time  Temp Pulse Resp B/P (MAP) Pulse Ox O2 Delivery O2 Flow Rate FiO2


 


20 20:27 36.4 74 24 178/87 (117) 96 High Flow N/C 4.00 


 


20 19:00  80      


 


20 18:31     93 High Flow N/C 5.00 


 


20 16:23 36.6 74 22 176/86 (116) 96 High Flow N/C 4.00 


 


20 13:41     94 High Flow N/C 5.00 


 


20 12:17  80      


 


20 12:00 37.0 80 20 161/74 (103) 92 High Flow N/C 5.00 


 


20 08:00      NIV Bilevel 30.00 


 


20 08:00 37.1 82 22 176/88 (117) 96 NIV Bilevel 30.00 


 


20 07:00  82      


 


20 06:31  80 31  93  30.00 


 


20 04:25 36.6 90 34 184/83 (116) 93 NIV Bilevel 30.00 


 


20 02:38  61 32  98  30.00 


 


20 01:00  80      


 


20 00:00 36.7       


 


20 22:07  76 27  95  30.00 














I & O 


 


 20





 07:00


 


Intake Total 500 ml


 


Output Total 3550 ml


 


Balance -3050 ml








General Appearance:  Mild Distress (respiratory)


Eyes:  Bilateral Eye PERRL, Bilateral Eye Other (Bilateral mattering mild)


HEENT:  PERRL/EOMI


Neck:  Normal Inspection, Supple


Respiratory:  Chest Non Tender; No Accessory Muscle Use; Decreased Breath Sounds

(bases)


Cardiovascular:  Regular Rate, Rhythm, No Edema


Gastrointestinal:  Normal Bowel Sounds, Soft


Rectal:  Deferred


Extremity:  Normal Inspection, Non Tender, No Pedal Edema


Neurologic/Psychiatric:  Other (asleep)


Skin:  Normal Color, Warm/Dry





Results


Lab


Laboratory Tests


20 05:00: 


Sodium Level 149H, Potassium Level 3.8, Chloride Level 110H, Carbon Dioxide 

Level 28, Anion Gap 11, Blood Urea Nitrogen 31H, Creatinine 0.80, Estimat 

Glomerular Filtration Rate > 60, BUN/Creatinine Ratio 39, Glucose Level 134H, 

Calcium Level 8.5, Corrected Calcium 9.5, Phosphorus Level 2.7, Magnesium Level 

2.3, Total Bilirubin 0.3, Aspartate Amino Transf (AST/SGOT) 19, Alanine 

Aminotransferase (ALT/SGPT) 22, Alkaline Phosphatase 43, Total Protein 5.4L, 

Albumin 2.7L





Microbiology


20 Gram Stain - Final, Complete


          


20 Sputum Culture - Final, Complete


          Usual upper respiratory joe


          Haemophilus influenza


20 Urine Culture - Final, Complete


          NO GROWTH


20 Blood Culture - Final, Complete


          Haemophilus influenza





Assessment/Plan


Assessment/Plan


Assessment and Plan


20   weaning trial started but rr increased and oxygen saturation declined 

he was placed back on AC and sedated


OG tube is bringing up bloody secretions    -lovenox held.   Pt is on PPI BID IV

        monitoring Hgb.


Continue Vanc/zosyn/azithromycin-    1 blood culture is going gram neg-  sent 

off to [facility]


blood pressure is better-  pressors are off.


-steroids started for his COPD and respiratory failure- but steroids will not be

good for his GI bleed.    


Dr. Peña consulted.





20-  extubated.   No more blood per OG tube


-blood cultures growing gram neg hemophilus species beta lactamase +   he is on 

zosyn which appears to be working- for the pneumonia and bacteremia.    If we 

need better coverage meropenem would be what we switch to.


-bedside swallow test





20-  hypernatremia starting IVF 1/ NS


changed ceftriaxone as it will treat both his H.infl pneumonia and bacteremia


-need to get him back to nasal cannula (baseline is 2-3 L oxygen NC) and then 

d/c to home.





20-  Na a little improved with IVF.  He is drinking water without issue and

eating.  


-Inpatient rehab to evaluate him other possible discharge plans include back to 

health and rehab; daughter also voiced she planned on taking him home.


-need to get him closer to his baseline oxygen requirement of 2-3L.


-will d/c his IVF since he taking po.


-pneumonia, bacteremia (H. influ)  covered with rocephin.





20  BNP elevated. Diuresing.


Still belly breathing and using accessory muscles.  Respiratory status has 

improved though from admission he has went from intubated to biPAP to HFNC and 

this AM he was on 5L oxygen via NC.


on methyprednisolone 40mg BID





20-   working on discharge planning-  could go to  [facility] but daugther of

patient not agreeable.  She would like to take him home; this would not be a 

good idea as patient is max assist.


-continue steroids, respiratory care, antibiotics.  


on D5W for hypernatremia.





Dispo:  improved but guarded.


Problems:  


(1) Pneumonia


Qualifiers:  


   


(2) Acute on chronic respiratory failure with hypoxia and hypercapnia


(3) Acute kidney injury


Assessment & Plan:  improved





(4) COPD with acute lower respiratory infection


(5) GI bleed


Qualifiers:  


   


Assessment & Plan:  trending hgb-   does not appear to be a brisk bleed- could 

be gastritis along with irritation from placement of OG.





(6) Bacteremia due to Gram-negative bacteria


(7) Acute hypernatremia





Clinical Quality Measures


DVT/VTE Risk/Contraindication:


Risk Factor Score Per Nursin


RFS Level Per Nursing on Admit:  4+=Very High











ISAI RAGSDALE MD              2020 21:57

## 2020-04-24 VITALS — DIASTOLIC BLOOD PRESSURE: 76 MMHG | SYSTOLIC BLOOD PRESSURE: 198 MMHG

## 2020-04-24 VITALS — DIASTOLIC BLOOD PRESSURE: 85 MMHG | SYSTOLIC BLOOD PRESSURE: 166 MMHG

## 2020-04-24 VITALS — SYSTOLIC BLOOD PRESSURE: 157 MMHG | DIASTOLIC BLOOD PRESSURE: 78 MMHG

## 2020-04-24 VITALS — DIASTOLIC BLOOD PRESSURE: 77 MMHG | SYSTOLIC BLOOD PRESSURE: 169 MMHG

## 2020-04-24 LAB
ALBUMIN SERPL-MCNC: 2.6 GM/DL (ref 3.2–4.5)
ALP SERPL-CCNC: 42 U/L (ref 40–136)
ALT SERPL-CCNC: 25 U/L (ref 0–55)
BASOPHILS # BLD AUTO: 0 10^3/UL (ref 0–0.1)
BASOPHILS NFR BLD AUTO: 0 % (ref 0–10)
BILIRUB SERPL-MCNC: 0.3 MG/DL (ref 0.1–1)
BUN/CREAT SERPL: 30
CALCIUM SERPL-MCNC: 8.1 MG/DL (ref 8.5–10.1)
CHLORIDE SERPL-SCNC: 103 MMOL/L (ref 98–107)
CO2 SERPL-SCNC: 31 MMOL/L (ref 21–32)
CREAT SERPL-MCNC: 0.7 MG/DL (ref 0.6–1.3)
EOSINOPHIL # BLD AUTO: 0 10^3/UL (ref 0–0.3)
EOSINOPHIL NFR BLD AUTO: 0 % (ref 0–10)
ERYTHROCYTE [DISTWIDTH] IN BLOOD BY AUTOMATED COUNT: 15.1 % (ref 10–14.5)
GFR SERPLBLD BASED ON 1.73 SQ M-ARVRAT: > 60 ML/MIN
GLUCOSE SERPL-MCNC: 165 MG/DL (ref 70–105)
HCT VFR BLD CALC: 32 % (ref 40–54)
HGB BLD-MCNC: 9.8 G/DL (ref 13.3–17.7)
LYMPHOCYTES # BLD AUTO: 1 X 10^3 (ref 1–4)
LYMPHOCYTES NFR BLD AUTO: 8 % (ref 12–44)
MANUAL DIFFERENTIAL PERFORMED BLD QL: NO
MCH RBC QN AUTO: 28 PG (ref 25–34)
MCHC RBC AUTO-ENTMCNC: 31 G/DL (ref 32–36)
MCV RBC AUTO: 92 FL (ref 80–99)
MONOCYTES # BLD AUTO: 0.6 X 10^3 (ref 0–1)
MONOCYTES NFR BLD AUTO: 5 % (ref 0–12)
NEUTROPHILS # BLD AUTO: 10.2 X 10^3 (ref 1.8–7.8)
NEUTROPHILS NFR BLD AUTO: 87 % (ref 42–75)
PLATELET # BLD: 232 10^3/UL (ref 130–400)
PMV BLD AUTO: 10.3 FL (ref 7.4–10.4)
POTASSIUM SERPL-SCNC: 3.9 MMOL/L (ref 3.6–5)
PROT SERPL-MCNC: 4.9 GM/DL (ref 6.4–8.2)
SODIUM SERPL-SCNC: 140 MMOL/L (ref 135–145)
WBC # BLD AUTO: 11.8 10^3/UL (ref 4.3–11)

## 2020-04-24 RX ADMIN — SODIUM CHLORIDE SCH MLS/HR: 900 INJECTION INTRAVENOUS at 11:03

## 2020-04-24 RX ADMIN — IPRATROPIUM BROMIDE AND ALBUTEROL SULFATE SCH ML: .5; 3 SOLUTION RESPIRATORY (INHALATION) at 02:33

## 2020-04-24 RX ADMIN — IPRATROPIUM BROMIDE AND ALBUTEROL SULFATE SCH ML: .5; 3 SOLUTION RESPIRATORY (INHALATION) at 10:30

## 2020-04-24 RX ADMIN — METOPROLOL TARTRATE SCH MG: 25 TABLET, FILM COATED ORAL at 08:29

## 2020-04-24 RX ADMIN — PANTOPRAZOLE SODIUM SCH MG: 40 INJECTION, POWDER, FOR SOLUTION INTRAVENOUS at 08:29

## 2020-04-24 RX ADMIN — METOPROLOL TARTRATE SCH MG: 25 TABLET, FILM COATED ORAL at 01:27

## 2020-04-24 RX ADMIN — ENOXAPARIN SODIUM SCH MG: 100 INJECTION SUBCUTANEOUS at 06:29

## 2020-04-24 RX ADMIN — IPRATROPIUM BROMIDE AND ALBUTEROL SULFATE SCH ML: .5; 3 SOLUTION RESPIRATORY (INHALATION) at 14:50

## 2020-04-24 RX ADMIN — IPRATROPIUM BROMIDE AND ALBUTEROL SULFATE SCH ML: .5; 3 SOLUTION RESPIRATORY (INHALATION) at 06:53

## 2020-04-24 RX ADMIN — BUDESONIDE SCH MG: 0.5 SUSPENSION RESPIRATORY (INHALATION) at 06:53

## 2020-04-24 RX ADMIN — MICONAZOLE NITRATE SCH APPLIC: 20 POWDER TOPICAL at 08:31

## 2020-04-24 RX ADMIN — METHYLPREDNISOLONE SODIUM SUCCINATE SCH MG: 40 INJECTION, POWDER, FOR SOLUTION INTRAMUSCULAR; INTRAVENOUS at 08:29

## 2020-04-24 NOTE — NUR
Visited with patient this morning. He is on Hi-flow NC. The nasal cannula was out of his 
nose when I entered the room et the tubing was kinked too. This was corrected and I visited 
with Ones about trying to not pull on all of his tubes because he needed them right now. He 
said "okay". His voice quality is a whisper so it is very hard to understand him but I was 
able to make out that he wants to go home and that he wants watermelon to eat. The cafeteria 
does not have any watermelon in stock but his breakfast is here. His primary care nurse and 
I pulled him up in bed using maximum assistance of 2. We are readying him to eat his 
breakfast with total assist x1 staff member feeding him his food. He was able to take a 
drink of water using his straw. He seems to be doing a little bit better but he is still 
very weak even when he is trying to speak.

## 2020-04-24 NOTE — PULMONARY PROGRESS NOTE
Subjective


Date Seen by a Provider:  Apr 24, 2020


Time Seen by a Provider:  06:39


Subjective/Events-last exam


Pt appears to be doing better.





Sepsis Event


Evaluation


Height, Weight, BMI


Height: '"


Weight: lbs. oz. kg; 27.00 BMI


Method:





Exam


Exam





Vital Signs








  Date Time  Temp Pulse Resp B/P (MAP) Pulse Ox O2 Delivery O2 Flow Rate FiO2


 


4/24/20 04:00 36.4 76 19 169/77 (107) 98 NIV Bilevel 50.00 


 


4/24/20 02:33  84 35  90  50.00 


 


4/24/20 01:00  72      


 


4/24/20 00:07 36.6 72 28 198/76 (116) 99 NIV Bilevel 50.00 


 


4/23/20 22:09  78 26  90  50.00 


 


4/23/20 20:27 36.4 74 24 178/87 (117) 96 High Flow N/C 4.00 


 


4/23/20 20:00      High Flow N/C 5.00 


 


4/23/20 19:00  80      


 


4/23/20 18:31     93 High Flow N/C 5.00 


 


4/23/20 16:23 36.6 74 22 176/86 (116) 96 High Flow N/C 4.00 


 


4/23/20 13:41     94 High Flow N/C 5.00 


 


4/23/20 12:17  80      


 


4/23/20 12:00 37.0 80 20 161/74 (103) 92 High Flow N/C 5.00 


 


4/23/20 08:00      NIV Bilevel 30.00 


 


4/23/20 08:00 37.1 82 22 176/88 (117) 96 NIV Bilevel 30.00 


 


4/23/20 07:00  82      














I & O 


 


 4/24/20





 07:00


 


Intake Total 780 ml


 


Output Total 1275 ml


 


Balance -495 ml








Height & Weight


Height: '"


Weight: lbs. oz. kg; 27.00 BMI


Method:


General Appearance:  Mild Distress (respiratory)


HEENT:  PERRL/EOMI


Neck:  Normal Inspection, Supple


Respiratory:  Chest Non Tender; No Accessory Muscle Use; Decreased Breath Sounds

(bases)


Cardiovascular:  Regular Rate, Rhythm, No Edema


Capillary Refill:  Less Than 3 Seconds


Gastrointestinal:  normal bowel sounds, non tender, soft, other (Ventral hernia)


Extremity:  Normal Inspection, Non Tender, No Pedal Edema


Neurologic/Psychiatric:  Other (asleep)


Skin:  Normal Color, Warm/Dry





Results


Lab


Laboratory Tests


4/22/20 16:23








4/23/20 05:00








4/24/20 05:27











Assessment/Plan


Assessment/Plan


Acute respiratory failure with pneumonia with Severe sepsis - improving 


   -currenlty on rocephin 


   - PT/OT


   -duoneb and solumedrol 


   -Pan cultures pending 


   -influenza is negative 


   -Will give lasix 40mg x 1. 


MRSA is negative - D.c Vanco 


   -COVID is neg


Hypernatremia


   -Start D5 W 





Anemia 


   -Monitor











LENORA SLADE DO              Apr 24, 2020 06:40

## 2020-04-24 NOTE — NUR
CM/SS follow up. 



CM/SS spoke wit Ann last evening 4/23 to discuss discharge plans. She verbalized that she 
had spoken with Henok over at [facility] and [facility] and wanted him to be transferred over 
for skilled placement. Ann stated that she is going to have Henok contact this SS to 
discuss case. Henok contacted this SS. CM/SS informed him of the patients oxygen need, BiPAP, 
and the need for max assist. He verbalized understanding. 



CM/SS faxed referral to [facility] and [facility]. Ann contacted this ss around 8:00 a.m. 
4/24 deciding she did not want patient to go skilled and wants to take him home. CM/SS 
discussed with the patient home health vs hospice again to help Ann make a decision. 
CM/SS asked Dr. Alarcon to contact, Ann. He verbalized understanding. Dr. Alarcon 
informed this SS that Ann would like patient to go home with [facility]. 
Palliative Care Nurse[facility]lder contacted [facility] and sent referral. A Non-emergent EMS 
will be needed for patient transport. CM/SS will fill out form and provide to the Nurse. 
CM/SS contacted Ann to verify address and let her know that [facility] would be 
contacting her for equipment needs. No other needs will follow until discharge.

## 2020-04-24 NOTE — OCCUPATIONAL THER DAILY NOTE
OT Current Status-Daily Note


Subjective


Spoke with RN who states pt is okay for therapy this morning. Pt resting in bed 

with eyes closed. Pt opens eyes and shakes head yes/no during treatment, but has

minimal responses. Pt shakes head no when asked about pain.





ADL-Treatment


Therapy Code Descriptions/Definitions 





Functional Fallon Measure:


0=Not Assessed/NA        4=Minimal Assistance


1=Total Assistance        5=Supervision or Setup


2=Maximal Assistance  6=Modified Fallon


3=Moderate Assistance 7=Complete IndependenceSCALE: Activities may be completed 

with or without assistive devices.





6-Indepedent-patient completes the activity by him/herself with no assistance 

from a helper.


5-Set-up or Clean-up Assistance-helper sets up or cleans up; patient completes 

activity. [place] assists only prior to or  


    following the activity.


4-Supervision or Touching Assistance-helper provides verbal cues and/or 

touching/steadying and/or contact guard assistance as patient completes 

activity. Assistance may be provided   


    throughout the activity or intermittently.


3-Partial/Moderate Assistance-helper does LESS THAN HALF the effort. [place] 

lifts, holds or supports trunk or limbs, but provides less than half the effort.


2-Substantial/Maximal Assistance-helper does MORE THAN HALF the effort. [place] 

lifts or holds trunk or limbs and provides more than half the effort.


2-Nedkwfysb-bsxysq does ALL the effort. Patient does none of the effort to 

complete the activity. Or, the assistance of 2 or more helpers is required for 

the patient to complete the  


    activity.


If activity was not attempted, code reason:


7-Patient Refused.


9-Not Applicable-not attempted and the patient did not perform the activity 

before the current illness, exacerbation or injury.


10-Not Attempted due to Environmental Limitations-(lack of equipment, weather 

restraints, etc.).


88-Not Attempted due to Medical Conditions or Safety Concerns.


Gentle ROM completed bilateral UE to increase ROM and strength needed for 

functional tasks. PROM completed bilateral UE at all joints. Pt able to squeeze 

with bilateral hands and assists minimally with elbow flexion with verbal cues 

for participation. Pt opens eyes occasionally, but unable to maintain. Pt 

resting in bed with needs met after session.





OT Short Term Goals


Short Term Goals


Eatin


Oral hygiene:  5


Upper body dressin





OT Long Term Goals


Long Term Goals


Time Frame:  2020


Eating (QC):  6


Oral Hygiene (QC):  6


Toileting Hygiene (QC):  3


Shower/Bathe Self (QC):  3


Upper Body Dressing (QC):  5


Lower Body Dressing (QC):  5


On/Off Footwear (QC):  4


Additional Goals:  1-Demonstrate ADL Tasks, 2-Verbalize Understanding, 3-

ImproveStrength/Moy


1=Demonstrate adherence to instructed precautions during ADL tasks.


2=Patient will verbalize/demonstrate understanding of assistive 

devices/modifications for ADL.


3=Patient will improve strength/tolerance for activity to enable patient to 

perform ADL's.





OT Education/Plan


Discharge Recommendations


Plan/Recommendations:  Continue POC





Treatment Plan/Plan of Care


Patient would benefit from OT for education, treatment and training to promote 

independence in ADL's, mobility, safety and/or upper extremity function for 

ADL's.


Plan of Care:  ADL Retraining, Functional Mobility, UE Funct Exercise/Act, W/C 

Management Training


Treatment Duration:  2020


Frequency:  5 times per week


Estimated Hrs Per Day:  .25 hour per day


Agreement:  Yes


Rehab Potential:  Guarded





Time/GCodes


Start Time:  10:41


Stop Time:  10:51


Total Time Billed (hr/min):  10


Billed Treatment Time


1 visit, EX(10minutes)











MUSTAPHA LUI OT            2020 11:03

## 2020-04-24 NOTE — NUR
Patient discharged home with home health services. Transported by EMS. Discharge summary and 
patient belongings given to EMS.

## 2020-04-24 NOTE — DISCHARGE SUMMARY
Discharge Summary


Hospital Course


Was the Problem List Reviewed?:  Yes


Problems/Dx:  


(1) Pneumonia


Status:  Acute


Qualifiers:  


   


(2) Acute on chronic respiratory failure with hypoxia and hypercapnia


Status:  Acute


(3) Acute kidney injury


Status:  Resolved


(4) COPD with acute lower respiratory infection


Status:  Acute


(5) GI bleed


Status:  Resolved


Qualifiers:  


   


(6) Bacteremia due to Gram-negative bacteria


Status:  Acute


(7) Acute hypernatremia


Status:  Resolved


Hospital Course


Date of Admission: 2020 at 01:30 


Admission Diagnosis :  





(1) Pneumonia


Status:  Acute


Qualifiers:  


   


(2) Acute on chronic respiratory failure with hypoxia and hypercapnia


Status:  Acute


(3) Acute kidney injury


Status:  Acute


(4) COPD with acute lower respiratory infection


Status:  Acute


(5) GI bleed


Qualifiers:  


   


(6) Bacteremia due to Gram-negative bacteria


(7) Acute hypernatremia





Family Physician/Provider: Arturo Archer DO  





Date of Discharge: 20 


Discharge Diagnosis: 





(1) Pneumonia


Status:  Acute


Qualifiers:  


   


(2) Acute on chronic respiratory failure with hypoxia and hypercapnia


Status:  Acute


(3) Acute kidney injury


Status:  Acute


(4) COPD with acute lower respiratory infection


Status:  Acute


(5) GI bleed- resolved


   


(6) Bacteremia due to Gram-negative bacteria


(7) Acute hypernatremia- resolved








Hospital Course:


86 yo male admitted for acute on chronic respiratory failure.


20   weaning trial started but rr increased and oxygen saturation declined 

he was placed back on AC and sedated


OG tube is bringing up bloody secretions    -lovenox held.   Pt is on PPI BID IV

        monitoring Hgb.


Continue Vanc/zosyn/azithromycin-    1 blood culture is going gram neg-  sent 

off to [facility]


blood pressure is better-  pressors are off.


-steroids started for his COPD and respiratory failure- but steroids will not be

good for his GI bleed.    


Dr. Peña consulted.





20-  extubated.   No more blood per OG tube


-blood cultures growing gram neg hemophilus species beta lactamase +   he is on 

zosyn which appears to be working- for the pneumonia and bacteremia.    If we 

need better coverage meropenem would be what we switch to.


-bedside swallow test





20-  hypernatremia starting IVF  NS


changed ceftriaxone as it will treat both his H.infl pneumonia and bacteremia


-need to get him back to nasal cannula (baseline is 2-3 L oxygen NC) and then 

d/c to home.





20-  Na a little improved with IVF.  He is drinking water without issue and

eating.  


-Inpatient rehab to evaluate him other possible discharge plans include back to 

health and rehab; daughter also voiced she planned on taking him home.


-need to get him closer to his baseline oxygen requirement of 2-3L.


-will d/c his IVF since he taking po.


-pneumonia, bacteremia (H. influ)  covered with rocephin.





20  BNP elevated. Diuresing.


Still belly breathing and using accessory muscles.  Respiratory status has 

improved though from admission he has went from intubated to biPAP to HFNC and 

this AM he was on 5L oxygen via NC.


on methyprednisolone 40mg BID





20-   working on discharge planning-  could go to  [place] but daugther of

patient not agreeable.  She would like to take him home; this would not be a 

good idea as patient is max assist.


-continue steroids, respiratory care, antibiotics.  


on D5W for hypernatremia- which brought his sodium level down to 140





20- discharged to home with [facility].











Labs and Pending Lab Test:


Laboratory Tests


20 05:27: 


White Blood Count 11.8H, Red Blood Count 3.47L, Hemoglobin 9.8L, Hematocrit 32L,

Mean Corpuscular Volume 92, Mean Corpuscular Hemoglobin 28, Mean Corpuscular 

Hemoglobin Concent 31L, Red Cell Distribution Width 15.1H, Platelet Count 232, 

Mean Platelet Volume 10.3, Neutrophils (%) (Auto) 87H, Lymphocytes (%) (Auto) 8L

, Monocytes (%) (Auto) 5, Eosinophils (%) (Auto) 0, Basophils (%) (Auto) 0, 

Neutrophils # (Auto) 10.2H, Lymphocytes # (Auto) 1.0, Monocytes # (Auto) 0.6, 

Eosinophils # (Auto) 0.0, Basophils # (Auto) 0.0, Sodium Level 140, Potassium 

Level 3.9, Chloride Level 103, Carbon Dioxide Level 31, Anion Gap 6, Blood Urea 

Nitrogen 21H, Creatinine 0.70, Estimat Glomerular Filtration Rate > 60, 

BUN/Creatinine Ratio 30, Glucose Level 165H, Calcium Level 8.1L, Corrected 

Calcium 9.2, Total Bilirubin 0.3, Aspartate Amino Transf (AST/SGOT) 18, Alanine 

Aminotransferase (ALT/SGPT) 25, Alkaline Phosphatase 42, Total Protein 4.9L, 

Albumin 2.6L





Microbiology


20 Gram Stain - Final, Complete


          


20 Sputum Culture - Final, Complete


          Usual upper respiratory joe


          Haemophilus influenza


20 Urine Culture - Final, Complete


          NO GROWTH


20 Blood Culture - Final, Complete


          Haemophilus influenza





Home Meds


Active


Cefdinir 300 Mg Capsule 300 Mg PO BID 7 Days


Dexamethasone 4 Mg Tablet 8 Mg PO DAILY 3 Days


Reported


Metoprolol Succinate 25 Mg Tab.er.24h 25 Mg PO DAILY


     HOLD FOR SBP LESS THAN 110-60 OR AP LESS THEN 60 BPM


Terazosin HCl 2 Mg Capsule 2 Mg PO HS


Potassium Chloride 20 Meq Tablet.er 20 Meq PO DAILY


Protonix (Pantoprazole Sodium) 40 Mg Tablet.dr 40 Mg PO DAILY


Lisinopril 10 Mg Tablet 10 Mg PO DAILY


Iprat-Albut 0.5-3(2.5) mg/3 ml (Ipratropium/Albuterol Sulfate) 3 Ml Ampul.neb 3 

Ml IH Q12H PRN


Colace (Docusate Sodium) 100 Mg Capsule 100 Mg PO DAILY PRN


Vitamin D3 (Cholecalciferol (Vitamin D3)) 25 Mcg Capsule 25 Mcg PO DAILY


Bisacodyl 10 Mg Supp.rect 10 Mg RC DAILY PRN


Aspirin EC (Aspirin) 81 Mg Tablet.dr 81 Mg PO DAILY


Albuterol Sulfate 2.5 Mg/0.5 Ml Vial.neb 2.5 Mg INH Q4H


Albuterol Sulfate 2.5 Mg/0.5 Ml Vial.neb 2.5 Mg INH Q6H


Advair 250-50 Diskus (Fluticasone/Salmeterol) 1 Each Blst.w.dev 1 Each IH BID


     RINSE MOUTH AFTER EACH USE


Tylenol Extra Strength (Acetaminophen) 500 Mg Tablet 500-1,000 Mg PO Q6H PRN


Assessment/Pt Instructions


-complete 7 days of cefdinir to treat his infections. 


-dexamethasone for 3 more days


-furosemide daily to make him urinate off excess water.


-[facility] consulted


-Patient has family that will be with him 24hours/day and care for him.


-His respiratory status may worsen but at least family will be present which 

will be better for Ones and family.


-Hospice care to be directed by [facility] director or Dr. Archer


Discharge Planning:  >30 minutes discharge planning





Discharge Instructions


Discharge Diet:  Other Diet (Dysphagia II )


Activity as Tolerated:  Yes





Discharge Physical Examination


Vital Signs





Vital Signs








  Date Time  Temp Pulse Resp B/P (MAP) Pulse Ox O2 Delivery O2 Flow Rate FiO2


 


20 11:51 36.0 78 25 157/78 (104) 97 High Flow N/C 5.00 


 


20 10:59        30








General Appearance:  No Apparent Distress


HEENT:  PERRL/EOMI


Respiratory:  Chest Non Tender, Decreased Breath Sounds


Cardiovascular:  Regular Rate, Rhythm


Gastrointestinal:  Normal Bowel Sounds, Soft


Extremity:  Non Tender


Skin:  Warm/Dry


Neurologic/Psychiatric:  Alert


Allergies:  


Coded Allergies:  


     No Known Drug Allergies (Unverified , 3/13/20)





Discharge Summary


Date of Admission


2020 at 01:30


Date of Discharge


2020


Admission Diagnosis


Septic shock


Comfort Measures/


End of Life Care:  Hospice Care (Home) ([facility])


Advance Care discuss with:  family member (s) (Ann Cornejo)





Discharge Diagnosis





(1) Pneumonia


Status:  Acute


Qualifiers:  


   


(2) Acute on chronic respiratory failure with hypoxia and hypercapnia


Status:  Acute


(3) Acute kidney injury


Status:  Resolved


Assessment & Plan:  improved





(4) COPD with acute lower respiratory infection


Status:  Acute


(5) GI bleed


Status:  Resolved


Assessment & Plan:  trending hgb-   does not appear to be a brisk bleed- could 

be gastritis along with irritation from placement of OG.


Qualifiers:  


   


(6) Bacteremia due to Gram-negative bacteria


Status:  Acute


Assessment & Plan:  will continue cefdinir for 7 more days.





(7) Acute hypernatremia


Status:  Resolved





Clinical Quality Measures


DVT/VTE Risk/Contraindication:


Risk Factor Score Per Nursin


RFS Level Per Nursing on Admit:  4+=Very High





Pneumonia:


Pseudomonal Risk:  COPD











ISAI RAGSDALE MD              2020 12:49

## 2020-04-24 NOTE — PROGRESS NOTE
Subjective


Subjective


Date Seen by Provider:  2020


Time Seen by Provider:  11:57


84 yo M


on 5L oxygen- doing better- 


he wanted watermelon this morning but cafeteria did not have any.





Review of Systems


ROS Unable to Obtain:  asleep but easy to wake up.


General:  No Chills, No Night Sweats


HEENT:  No Head Aches, No Visual Changes


Pulmonary:  No Dyspnea; Cough


Cardiovascular:  No: Chest Pain, Palpitations


Gastrointestinal:  No: Nausea, Vomiting


Genitourinary:  No Dysuria, No Frequency


Musculoskeletal:  No: neck pain


Neurological:  Weakness





All Other Systems Reviewed


All Other Systems Reviewed:  Yes





Objective


Exam


Vital Signs





Vital Signs - First Documented








 20





 00:00 03:37 04:15


 


Temp   36.7


 


Pulse 95  


 


Resp 23  


 


B/P (MAP) 112/56 (74)  


 


Pulse Ox 93  


 


O2 Delivery Mechanical Ventilator  


 


O2 Flow Rate 40.00  


 


FiO2  40 





Capillary Refill : Less Than 3 SecondsLess Than 3 Seconds


General Appearance:  Mild Distress (respiratory)


Eyes:  Bilateral Eye PERRL, Bilateral Eye Other (Bilateral mattering mild)


HEENT:  PERRL/EOMI


Neck:  Normal Inspection, Supple


Respiratory:  Chest Non Tender; No Accessory Muscle Use; Decreased Breath Sounds

(bases)


Cardiovascular:  Regular Rate, Rhythm, No Edema


Gastrointestinal:  Normal Bowel Sounds, Soft


Rectal:  Deferred


Extremity:  Normal Inspection, Non Tender, No Pedal Edema


Neurologic/Psychiatric:  Other (asleep)


Skin:  Normal Color, Warm/Dry





Results


Lab


Laboratory Tests


20 05:27: 


White Blood Count 11.8H, Red Blood Count 3.47L, Hemoglobin 9.8L, Hematocrit 32L,

Mean Corpuscular Volume 92, Mean Corpuscular Hemoglobin 28, Mean Corpuscular 

Hemoglobin Concent 31L, Red Cell Distribution Width 15.1H, Platelet Count 232, 

Mean Platelet Volume 10.3, Neutrophils (%) (Auto) 87H, Lymphocytes (%) (Auto) 8L

, Monocytes (%) (Auto) 5, Eosinophils (%) (Auto) 0, Basophils (%) (Auto) 0, 

Neutrophils # (Auto) 10.2H, Lymphocytes # (Auto) 1.0, Monocytes # (Auto) 0.6, 

Eosinophils # (Auto) 0.0, Basophils # (Auto) 0.0, Sodium Level 140, Potassium 

Level 3.9, Chloride Level 103, Carbon Dioxide Level 31, Anion Gap 6, Blood Urea 

Nitrogen 21H, Creatinine 0.70, Estimat Glomerular Filtration Rate > 60, 

BUN/Creatinine Ratio 30, Glucose Level 165H, Calcium Level 8.1L, Corrected 

Calcium 9.2, Total Bilirubin 0.3, Aspartate Amino Transf (AST/SGOT) 18, Alanine 

Aminotransferase (ALT/SGPT) 25, Alkaline Phosphatase 42, Total Protein 4.9L, 

Albumin 2.6L





Microbiology


20 Gram Stain - Final, Complete


          


20 Sputum Culture - Final, Complete


          Usual upper respiratory joe


          Haemophilus influenza


20 Urine Culture - Final, Complete


          NO GROWTH


20 Blood Culture - Final, Complete


          Haemophilus influenza





Assessment/Plan


Assessment/Plan


Assessment and Plan


20   weaning trial started but rr increased and oxygen saturation declined 

he was placed back on AC and sedated


OG tube is bringing up bloody secretions    -lovenox held.   Pt is on PPI BID IV

        monitoring Hgb.


Continue Vanc/zosyn/azithromycin-    1 blood culture is going gram neg-  sent o

ff to [facility]


blood pressure is better-  pressors are off.


-steroids started for his COPD and respiratory failure- but steroids will not be

good for his GI bleed.    


Dr. Peña consulted.





20-  extubated.   No more blood per OG tube


-blood cultures growing gram neg hemophilus species beta lactamase +   he is on 

zosyn which appears to be working- for the pneumonia and bacteremia.    If we 

need better coverage meropenem would be what we switch to.


-bedside swallow test





20-  hypernatremia starting IVF  NS


changed ceftriaxone as it will treat both his H.infl pneumonia and bacteremia


-need to get him back to nasal cannula (baseline is 2-3 L oxygen NC) and then 

d/c to home.





20-  Na a little improved with IVF.  He is drinking water without issue and

eating.  


-Inpatient rehab to evaluate him other possible discharge plans include back to 

health and rehab; daughter also voiced she planned on taking him home.


-need to get him closer to his baseline oxygen requirement of 2-3L.


-will d/c his IVF since he taking po.


-pneumonia, bacteremia (H. influ)  covered with rocephin.





20  BNP elevated. Diuresing.


Still belly breathing and using accessory muscles.  Respiratory status has 

improved though from admission he has went from intubated to biPAP to HFNC and 

this AM he was on 5L oxygen via NC.


on methyprednisolone 40mg BID





20-   working on discharge planning-  could go to  [facility] but daugther of

patient not agreeable.  She would like to take him home; this would not be a 

good idea as patient is max assist.


-continue steroids, respiratory care, antibiotics.  


on D5W for hypernatremia.





Dispo:  improved but guarded.


Problems:  


(1) Pneumonia


Qualifiers:  


   


(2) Acute on chronic respiratory failure with hypoxia and hypercapnia


(3) Acute kidney injury


Assessment & Plan:  improved





(4) COPD with acute lower respiratory infection


(5) GI bleed


Qualifiers:  


   


Assessment & Plan:  trending hgb-   does not appear to be a brisk bleed- could 

be gastritis along with irritation from placement of OG.





(6) Bacteremia due to Gram-negative bacteria


(7) Acute hypernatremia





Clinical Quality Measures


DVT/VTE Risk/Contraindication:


Risk Factor Score Per Nursin


RFS Level Per Nursing on Admit:  4+=Very High











ISAI RAGSDALE MD              2020 12:18

## 2020-04-24 NOTE — PHYSICAL THERAPY DAILY NOTE
PT Daily Note-Current


Subjective


Patient is in bed and very listless with minimal responsiveness.





Mental Status


Patient Orientation:  Listless


Attachments:  Oxygen (1), [place] Catheter





Transfers


SCALE: Activities may be completed with or without assistive devices.





6-Indepedent-patient completes the activity by him/herself with no assistance 

from a helper.


5-Set-up or Clean-up Assistance-helper sets up or cleans up; patient completes 

activity. [place] assists only prior to or  


    following the activity.


4-Supervision or Touching Assistance-helper provides verbal cues and/or 

touching/steadying and/or contact guard assistance as patient completes 

activity. Assistance may be provided   


    throughout the activity or intermittently.


3-Partial/Moderate Assistance-helper does LESS THAN HALF the effort. [place] 

lifts, holds or supports trunk or limbs, but provides less than half the effort.


2-Substantial/Maximal Assistance-helper does MORE THAN HALF the effort. [place] 

lifts or holds trunk or limbs and provides more than half the effort.


8-Xgmxckihw-qiorep does ALL the effort. Patient does none of the effort to 

complete the activity. Or, the assistance of 2 or more helpers is required for 

the patient to complete the  


    activity.


If activity was not attempted, code reason:


7-Patient Refused.


9-Not Applicable-not attempted and the patient did not perform the activity 

before the current illness, exacerbation or injury.


10-Not Attempted due to Environmental Limitations-(lack of equipment, weather 

restraints, etc.).


88-Not Attempted due to Medical Conditions or Safety Concerns.


Roll Left & Right (QC):  1 (x 2)


rolling side to side with CNA to cleanse and change patient and bedding





Exercises


Supine Ex:  Ankle pumps, Heel Slides, Straight leg raise, Hip abd/add


Supine Reps:  15 (bilaterally PROM)





Assessment


Patient tolerated treatment and is repositioned in bed dependent assist x 2.





PT Long Term Goals


Long Term Goals


PT Long Term Goals Time Frame:  May 9, 2020


Roll Left & Right (QC):  4


Sit to Lying (QC):  4


Lying-Sitting on Side/Bed(QC):  4


Sit to Stand (QC):  3


Chair/Bed-to-Chair Xfer(QC):  3


Toilet Transfer (QC):  3


Does the Patient Walk:  No and Walking Goal IS indicated


Walk 10 feet (QC):  3





PT Plan


Treatment/Plan


Treatment Plan:  Continue Plan of Care


Treatment Plan:  Bed Mobility, Education, Functional Activity Moy, Functional 

Strength, Gait, Safety, Therapeutic Exercise, Transfers


Treatment Duration:  May 9, 2020


Frequency:  6 times per week


Estimated Hrs Per Day:  .5 hour per day


Patient and/or Family Agrees t:  Yes





Time/GCodes


Time In:  920


Time Out:  930


Total Billed Treatment Time:  10


Total Billed Treatment


1 visit


FA 10 min











MONTANA DEWEY PT              Apr 24, 2020 10:04

## 2020-06-04 ENCOUNTER — HOSPITAL ENCOUNTER (OUTPATIENT)
Dept: HOSPITAL 75 - HOSHH | Age: 85
End: 2020-06-04
Attending: FAMILY MEDICINE
Payer: COMMERCIAL

## 2020-06-04 DIAGNOSIS — I10: ICD-10-CM

## 2020-06-04 DIAGNOSIS — R30.0: ICD-10-CM

## 2020-06-04 DIAGNOSIS — J44.9: Primary | ICD-10-CM

## 2020-06-04 LAB
AMORPH SED URNS QL MICRO: (no result) /LPF
APTT PPP: YELLOW S
BACTERIA #/AREA URNS HPF: (no result) /HPF
BILIRUB UR QL STRIP: NEGATIVE
FIBRINOGEN PPP-MCNC: CLEAR MG/DL
GLUCOSE UR STRIP-MCNC: NEGATIVE MG/DL
KETONES UR QL STRIP: NEGATIVE
LEUKOCYTE ESTERASE UR QL STRIP: (no result)
NITRITE UR QL STRIP: NEGATIVE
PH UR STRIP: 7.5 [PH] (ref 5–9)
PROT UR QL STRIP: NEGATIVE
RBC #/AREA URNS HPF: (no result) /HPF
SP GR UR STRIP: 1.01 (ref 1.02–1.02)
SQUAMOUS #/AREA URNS HPF: (no result) /HPF
WBC #/AREA URNS HPF: (no result) /HPF

## 2020-06-04 PROCEDURE — 81000 URINALYSIS NONAUTO W/SCOPE: CPT

## 2020-06-04 PROCEDURE — 87088 URINE BACTERIA CULTURE: CPT

## 2020-06-23 ENCOUNTER — HOSPITAL ENCOUNTER (OUTPATIENT)
Dept: HOSPITAL 75 - HOSHH | Age: 85
End: 2020-06-23
Attending: FAMILY MEDICINE
Payer: COMMERCIAL

## 2020-06-23 DIAGNOSIS — J44.9: Primary | ICD-10-CM

## 2020-06-23 DIAGNOSIS — Z79.899: ICD-10-CM

## 2020-06-23 LAB
BUN/CREAT SERPL: 11
CALCIUM SERPL-MCNC: 8.2 MG/DL (ref 8.5–10.1)
CHLORIDE SERPL-SCNC: 98 MMOL/L (ref 98–107)
CO2 SERPL-SCNC: 34 MMOL/L (ref 21–32)
CREAT SERPL-MCNC: 0.72 MG/DL (ref 0.6–1.3)
GFR SERPLBLD BASED ON 1.73 SQ M-ARVRAT: > 60 ML/MIN
GLUCOSE SERPL-MCNC: 90 MG/DL (ref 70–105)
POTASSIUM SERPL-SCNC: 4.5 MMOL/L (ref 3.6–5)
SODIUM SERPL-SCNC: 138 MMOL/L (ref 135–145)

## 2020-06-23 PROCEDURE — 83880 ASSAY OF NATRIURETIC PEPTIDE: CPT

## 2020-06-23 PROCEDURE — 80048 BASIC METABOLIC PNL TOTAL CA: CPT
